# Patient Record
(demographics unavailable — no encounter records)

---

## 2024-11-25 NOTE — REVIEW OF SYSTEMS
[Fatigue] : no fatigue [Fever] : no fever [Eye Redness] : no redness [Eye Itching] : no itchy eyes [Puffy Eyelids] : no puffy ~T eyelids [Nosebleeds] : no epistaxis [Rhinorrhea] : no rhinorrhea [Nasal Dryness] : no dryness of the nose [Nasal Congestion] : no nasal congestion [Cyanosis] : no cyanosis [Chest Pain] : no chest pain or discomfort [Palpitations] : no palpitations [Difficulty Breathing] : no dyspnea [SOB at Rest] : no shortness of breath at rest [SOB with Exertion] : no dyspnea on exertion [Nocturnal Awakening] : no nocturnal awakening with shortness of breath [Cough] : no cough [Vomiting] : no vomiting [Diarrhea] : no diarrhea [Fainting (Syncope)] : no fainting

## 2024-11-25 NOTE — HISTORY OF PRESENT ILLNESS
[de-identified] : Thai is an 14 year old boy with no significant PMH here for follow-up of asthma.  Has used albuterol a few times during hockey in the last few months. A few weeks ago he was playing ice hockey in [place] and used his albuterol. 11/19 - was outdoors playing roller hockey and had chest tightness toward the end of playing. Dad picked him up and he said his chest was bothering him. Dad gave him 2 puffs of albuterol with no improvement. Tried a duoneb at home with no improvement so went to [facility].  RVP+ to rhinoenterovirus.   Has had nasal congestion for about 2 months. About once every 2 weeks he missed school due to nasal congestion, some sinus pressure.  Did not take any antibiotics.  Some nausea, abdominal pain and diarrhea.  Parents did not restart Alvesco at any times because they attributed nasal symptoms to allergies rather than a cold.   Hospital Course 15yo M hx of asthma presenting with x1d of increased WOB. Pt has had cough for the last week, and began having difficulty breathing 11/19 at night while playing roller hockey outside. He received 5 puffs of albuterol over an hour at home, and then one albuterol/atrovent nebulizer with no improvement of symptoms, and so parents brought to the ED. Mom states that pt has had intermittent nasal congestion and rhinorrhea over the last 3 weeks. No fevers at home, no diarrhea, no vomiting. No longer takes a control med at home, was weaned off Alvesco this past summer per mom. Prior to that, he has been on and off multiple controller medications for years. Has never been admitted for asthma before, though has had multiple ER visits, last in May 2024.  ED: RSS 11, 3B2B, dex, still diminished and so given Mg + NSB with some improvement. Unable to make past q2h kip, and so admitted on q2 albuterol. RVP R/E+  PMH: asthma Meds: Azelastine nasal spray daily Allergies: seasonal  Floor Course (11/20): Pt arrived to floor in stable condition. Able to wean to q4h albuterol on 11/20. Continued to tolerate PO appropriately. Pediatric pulmonology was contacted during this admission and recommended restarting Alvesco as 160mcg 2 puffs BID at the time of discharge with follow up with pulmonology in 4-6 weeks after discharge. Patient received 2 doses of decadron during this admission for a full course of steroids prior to discharge.  Child continued to tolerate PO with adequate UOP. Child remained well-appearing, with no concerning findings noted on physical exam. Care plan d/w caregivers who endorsed understanding. Anticipatory guidance and strict return precautions d/w caregivers in great detail. Child deemed stable for d/c home w/ recommended PMD f/u in 1-2 days of discharge.  June 2020:  Fall - went back to Dr. Gerardo roldan, started azelastine. Had a bronchoscopy that was relatively normal but some bronchospasm during the procedure. Starting in September he had bad nasal congestion, dark circles,  Not many courses of antibiotics.  Slept with a box of tissues.  Using albuterol rarely. Had a sleepover.      ALvesco started in January - initially 80 and then increased to 160. Symptoms much better.  About 1 week ago he was out and about, felt chest tightness. Parents tried albuterol puffs first and then the combineb. Tried another combineb in the AM and pt still felt wheezing and chest tightness. Went to [place] - poor air entry - got a dose of decadron and another combineb - still felt tight, second combineb and then he opened up and felt better.  July 2023: Briefly took Advair 115mcg/puff, 2 puffs BID x 2 weeks.then dropped to Advair 45mcg./puff 2 puffs BID and stopped completely April 16th. Sometime at the end of June he had a hockey tournament, was running around afterwards, had a coughing fit as he was laying down which improved with albuterol.  Went to sleep, woke up and played 2 games the next day with no issues. No nocturnal cough. No wheezing.  No cough with activity.  Also weaning off of Xyzal. Still seeing endocrinologist. Had another bone age a month ago which was normal.  Has another appt in October - considering a growth hormone test.  No infections, no abx use.   Feb 2023: After last visit stopped Advair regularly. Had some minor URI sx that self-resolved.  In Jan 2022 had some mild cough. Saw PMD and he thought Thai sounded tight. Restarted Advair and also gave albuterol x 1 month. Recovered from this cold. Has seemed congested on and off since his last cold. A week ago, came home from hockey and felt tight. Sat on the bench for a few, then went back to practice, and when he went home he said "my asthma is flaring up." Tried 2 puffs, and then another 2. Then tried a combineb which did nothing. Went to [facility] and had some oral steroid, then had a combineb with no improvement.  Normal O2 sat.  He was advised to go to the ED at [place].  At [place] he had a combineb and that opened him up - pt said "i feel better" He was discharged with 3 days of OCS.   Sometimes takes hockey before hockey - unsure if it helped.   Nov 2022: Pt was seen by marian in October for growth and there are no concerns right now. Bone imaging was normal. One of the markers was low but nothing to do at this time, will follow up again in January. Continued to recommend to avoid steroids.   In September, per our recommendations, pt discontinued the Flovent and switched over to  Advair 45 BID. He has not taken the Advair since October 25th. No recent illnesses, cough, congestion, URI, fevers, or rashes.   Pt takes 2 puffs of albuterol before ice hockey games about once a week. Pt start doing this a few weeks ago after he appeared to be winded after a game. He does not take albuterol before practices.  Yesterday, patient was outside for a little bit was noted to be coughing before bed. Father was going to give albuterol but patient was already asleep, no coughing overnight or today.   Meds: Dymista, Xyzal Vaccines: IUTD, received flu & COVID booster  August 2022: Continued to have very mild obstruction on spirometry and recommended to continue albuterol prn but weaned off of Flovent 110 with instructions to restart if there are any signs of cough or cold and to begin Advair 45 with first signs of illness.   July 13th: adenoidectomy and turbinate shaving -surgery was uneventful and pt was discharged same day. Had nosebleeds a few days later. ENT recommended saline spray and saline gel. Pt has been taking 3 times a day. Nosebleeds stopped.  Saw endocrine due to concerns about growth- blood tests pending - did not seem overly concerned.   No cough, no wheeze, no colds.  Attends summer camp. Engages in hockey, swimming - no limitations.  On Flovent 110, 2 puffs BID. Uses the spacer every time.  No albuterol.   June: Weaned Flovent to 1 puff BID starting May 15th. Playing hockey with no limitation. No cough with hockey. Saw Dr. Carrillo on 6/2 and was given a course of augmentin. Diagnosed with a sinusitis and treated for 10 days with abx.  Cough started 2 days ago. Cough during sleep last night. No fever, no nasal symptoms.  Scheduled for adenoid shaving on 7/13. Needs pre-surgical blood work next week.  Vacation July 6-11 - driving to [place].   He had a viral infection in March - fever, cough, headache. Then he had the Flu a week and a half ago - worse than COVID. Pt had the vaccine. Headache, vomiting, cough, fever to 103. No one in his school is wearing a mask. Tried albuterol a few times in the beginning and it did not help so did not continue. More tired than usual, appetite still not 100%. Still having some cough. Mom thinks hockey triggered it. Took zarbee's last night.   Missed ENT appt as pt had the flu.   Feb: COVID in January - mostly GI sx, severe headache, fever, abdominal cramps. No respiratory symptoms. Lasted 2.5-3 days max. Now back to baseline.  2.5 weeks ago he was playing volleyball and went to the school nurse who gave him 2 puffs. Took albuterol 2 puffs a total of 3 times and then was back to normal. Played [employer] gym 2 days later and was fine. Pacer test in December pre-COVID and felt chest tightness and took 2 puffs which helped. He continues on Flovent 44, 2 puffs BID. Cough a few nights before sleep. No nocturnal awakenings. Plays ice hockey a few times a week and keeps up. Has been playing ice hockey outdoor at night in very temps and has been fine. NO albuterol use with ice hockey.    Sept: Last seen on Aug 30th for spirometry and FeNO. FeNO was 8. He completed 5 days of azithromycin a few days ago. His cough is markedly improved. He continues on Flovent and has not needed any recent albuterol. Still has some nasal congestion but this has improved too. Pt has good energy level and can participate in sports.  Symptoms started to flare yesterday - felt lightheaded and tight. Happened at the end of recess. Went to the school nurse and had 2puffs of albuterol which did nothing. Then went to PMD who gave 60mg of prednisone. Took albuterol 2 puffs at school , then 1 puff at 8PM and then 1 at midnight. Then took 2 puffs at 8AM and 12PM today. Still feels tight. Some rhinorrhea. The night before this all started he had nausea, runny nose, scratchy eyes and headache.   He tried to lower his flovent to 1 puff BID but after 4 days he started to have problems with breathing at recess. School nurse heard some wheezing. Increased Flovent back to 2 puffs BID. 1.5 weeks ago he went to the school nurse again due to trouble breathing after doing relay races. [place] some squeaking and school nurse heard some localized wheezing. Currently still playing baseball and ice hockey - no problem. Outfield in baseball. Hockey he takes breaks every 2 minutes. Allergy symptoms have been flaring. Takes dymista and xyzal. Never has sx at home or on the weekends.   April 6th: Since his last visit he had an asthma exacerbation for which he was seen in the ED. He received albuterol puffs and decadron PO. Continued to have cough after this so was seen by PMD and given amoxicillin x 10 days which improved the cough. Father was diagnosed with COVID on 3/19. Thai had 2 rapid tests negative and 1 nasal PCR that was negative. Taking Flovent 110mcg/puff, 2 puffs BID. Last albuterol use was about 5 days before he started to get sick again.  Pt played ice hockey last week and was fine. Has a baseball scrimmage today.  No cough. Some itchy eyes. Some nasal congestion. Still on dymista and xyzal. Meds are helping. Some nausea or dry throat in the AM. Pet cat sleeps with him for some of the night. No dust mite proof covers.   Nov 2020: Thai has been well since his last visit. He continues to do distance learning for school.  He takes flovent 1 puff BID. No nocturnal cough, no activity limitation. No recent albuterol use. Also takes dymista daily and levoceitirizne 2.5mL. Recently started to have some nasal congestion.  June 2020: Last albuterol use was in March. He has been continuing on Flovent 110mcg/puff, 2 puffs BID with a spacer.  With rollerblading and running he has been ok. No coughing with activity. Takes Dymista everyday as well as levocetirizine.  Patient and mother have questions about summer camp, play dates, socialization.  Feb 2020: He had the flu since his last visit - took albuterol during this course. Since he recovered from this he still has had residual cough. He has not stopped coughing since her had the flu a few weeks ago. Went to the school nurse 4 times this week. The school nurse told his mother that she heard wheezing all 4 times that patient presented to her. Seems winded with hockey though he ploughs through the game. He started Advair the day after his last appt and has been completely adherent with it.   January 2020: Last seen in December by Dr. Cunningham who restarted him on Flovent 44. He had been doing well through the fall while off of Flovent but then started to develop wheezing out of the blue in December. Playing gym and doing "turkey trots" caused him to wheeze. Albuterol helped.  Although he has been back on Flovent his asthma seems to be doing worse comaraed to in December. Using albuterol a few times a week for coughing which helps. No nocturnal cough. Sometimes he feels wheezing in his chest. He has been having trouble in gym but not with ice hockey. Mother thinks that when he coughs a lot his color seems to drain from his face.   August, 2019: He was last seen in March at which time he was advised to stop Flovent when the weather changed to the spring.  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 No activity limitation during the sports. He has been having an early morning cough.  Last night he had a very low grade temp.  No wheezing, no albuterol use.  Over the past 2 days he has had some mild cough, low grade fever.  Just went for annual physical.  Takes dymista and levocetirizine daily.  No recent antibiotic use.   March 2019: No flu this year.  2 episodes of "wheezing" noted at school during after school house while running in the gym. He did not receive albuterol at the time. Symptoms subsided within a few minutes. No chest tightness, some cough, no activity limitation. Patient noted that he had some wheezing. He had only 1 minor cold this winter and did not use the ventolin.  He was playing ice hockey and skiing with no problems.  Minimal nasal symptoms while on xyzal but if he comes off of it he has symptoms. Itchy eyes.   November: He has been well since his last visit. He tried to come off of his oral antihistamine for 3 or 4 days and his eyes became puffy. He continues to take Dymista daily. 2 weeks ago he had a nosebleed. Mom continued the Dymista but applied some saline.  About 1 month ago he was in the [employer], was playing tag and developed wheezing. He told his teacher, took water and sat down. Did not take the inhaler and sx resolved.  No cough at night. No activity limitation. He play baseball, football and ice hockey.  He received his flu shot.   Last weekend he had a fever to 101, headache and stomach but these self-resolved.   August, 2018: He had a cold last week with fever. His allergy symptoms have been flaring up this week with nasal congestion and some ocular pruritus. He has been taking dymista and levocetirizine daily since his last visit. He has also been on Flovent since the end of May. He has not required and doses of albuterol. This summer he attended baseball camp, regular camp and wrestling camp. He was able to keep up with his peers and never had any activity limitation. Even prior to the Flovent he never had difficulty with baseball so this is not really a change for him, however the cough has improved dramatically since starting Flovent. Cough initially started in January and was present both day and night. It prevented Thai from falling asleep and was very disruptive in class. This most recent cold was characterized by headache, fever and congestion but no cough.  Overall his mom thinks he is doing much better in regards to cough since starting Flovent.  June 2018: At his last visit he was started on Flovent 44mcg/puff due to persistent cough despite treatment for rhinitis and some bronchodilator reversibility. His cough has considerably improved since starting Flovent. He has been taking Flovent 2 puffs BID with a spacer. Rinses his mouth out after Flovent use. He has no activity limitation and no nocturnal cough. He is at baseball camp and keeps up with peers. Early AM cough has disappeared.  He continues on the Flonase and levocetirizine. No more nasal congestion.  He is stable from a concussion standpoint.  May, 2018:  At his last visit, his nasal smear was positive for eosinophils. IDT positive to cat and mold and the IDT to dust mite became red and itchy the next day. Since his last visit he has been much improved overall from an allergy perspective however he fell down the stairs and has a concussion. His grandfather also just passed away. No more episodes of ocular swelling.  He has episodes of coughing more at night than during the day but also during the day. He has a dry cough that prevents him from falling asleep at times. Sometimes he wakes up with chest tightness and then coughs. Last albuterol use was 3 days ago.   Nasal congestion much better. Some eye itching. Taking levocetirizine nightly as well as flonase.   March, 2018: Since the beginning of February he has had unilateral and bilateral ocular swelling and pruritus. Burning sometimes too. Eyes look "glistening" and he has some clear/whitish drainage. No green drainage. No fever. Episodes of ocular swelling last a few hours and then self- resolve. Occurs more at home but does have symptoms at school. Seen by Dr. Brooks in February and both SPT and immunocap testing were negative to environmental allergens. Seeking any possible further allergy testing. He tried daily zyrtec and took that for the past month. Prior to that he was on claritin daily.  Olopatadine since February - daily. Also on dymista nasal spray since Feb.  Some of the rhinorrhea symptoms have improved but ocular symptoms are the same.   Seen by Dr. Alarcon from ENT who did a rhinoscopy - no nasal polyps but significant nasal congestion. Recommended sinus rinses.   Food allergy: No suspicion for food allergy. Tolerates milk, eggs, wheat, soy, peanut, tree nut, fish and shellfish.  Urticaria - develops hives when he is sick. Occurs a few times a year, not frequently.   Infections - about once or twice a year he takes antibiotics. No recurrent AOM, PNA, bronchitis.  No hospitalizations

## 2024-11-25 NOTE — HISTORY OF PRESENT ILLNESS
[de-identified] : Thai is an 14 year old boy with no significant PMH here for follow-up of asthma.  Has used albuterol a few times during hockey in the last few months. A few weeks ago he was playing ice hockey in [employer] and used his albuterol. 11/19 - was outdoors playing roller hockey and had chest tightness toward the end of playing. Dad picked him up and he said his chest was bothering him. Dad gave him 2 puffs of albuterol with no improvement. Tried a duoneb at home with no improvement [place] [facility] [place]  RVP+ to rhinoenterovirus.   Has had nasal congestion for about 2 months. About once every 2 weeks he missed school due to nasal congestion, some sinus pressure.  Did not take any antibiotics.  Some nausea, abdominal pain and diarrhea.  Parents did not restart Alvesco at any times because they attributed nasal symptoms to allergies rather than a cold.   Hospital Course 13yo M hx of asthma presenting with x1d of increased WOB. Pt has had cough for the last week, and began having difficulty breathing 11/19 at night while playing roller hockey outside. He received 5 puffs of albuterol over an hour at home, and then one albuterol/atrovent nebulizer with no improvement of symptoms, and so parents brought to the ED. Mom states that pt has had intermittent nasal congestion and rhinorrhea over the last 3 weeks. No fevers at home, no diarrhea, no vomiting. No longer takes a control med at home, was weaned off Alvesco this past summer per mom. Prior to that, he has been on and off multiple controller medications for years. Has never been admitted for asthma before, though has had multiple ER visits, last in May 2024.  ED: RSS 11, 3B2B, dex, still diminished and so given Mg + NSB with some improvement. Unable to make past q2h kip, and so admitted on q2 albuterol. RVP R/E+  PMH: asthma Meds: Azelastine nasal spray daily Allergies: seasonal  Floor Course (11/20): Pt arrived to floor in stable condition. Able to wean to q4h albuterol on 11/20. Continued to tolerate PO appropriately. Pediatric pulmonology was contacted during this admission and recommended restarting Alvesco as 160mcg 2 puffs BID at the time of discharge with follow up with pulmonology in 4-6 weeks after discharge. Patient received 2 doses of decadron during this admission for a full course of steroids prior to discharge.  Child continued to tolerate PO with adequate UOP. Child remained well-appearing, with no concerning findings noted on physical exam. Care plan d/w caregivers who endorsed understanding. Anticipatory guidance and strict return precautions d/w caregivers in great detail. Child deemed stable for d/c home w/ recommended PMD f/u in 1-2 days of discharge.  June 2020:  Fall - went back to Dr. Gerardo roldan, started azelastine. Had a bronchoscopy that was relatively normal but some bronchospasm during the procedure. Starting in September he had bad nasal congestion, dark circles,  Not many courses of antibiotics.  Slept with a box of tissues.  Using albuterol rarely. Had a sleepover.      ALvesco started in January - initially 80 and then increased to 160. Symptoms much better.  About 1 week ago he was out and about, felt chest tightness. Parents tried albuterol puffs first and then the combineb. Tried another combineb in the AM and pt still felt wheezing and chest tightness. Went to [place] - poor air entry - got a dose of decadron and another combineb - still felt tight, second combineb and then he opened up and felt better.  July 2023: Briefly took Advair 115mcg/puff, 2 puffs BID x 2 weeks.then dropped to Advair 45mcg./puff 2 puffs BID and stopped completely April 16th. Sometime at the end of June he had a hockey tournament, was running around afterwards, had a coughing fit as he was laying down which improved with albuterol.  Went to sleep, woke up and played 2 games the next day with no issues. No nocturnal cough. No wheezing.  No cough with activity.  Also weaning off of Xyzal. Still seeing endocrinologist. Had another bone age a month ago which was normal.  Has another appt in October - considering a growth hormone test.  No infections, no abx use.   Feb 2023: After last visit stopped Advair regularly. Had some minor URI sx that self-resolved.  In Jan 2022 had some mild cough. Saw PMD and he thought Thai sounded tight. Restarted Advair and also gave albuterol x 1 month. Recovered from this cold. Has seemed congested on and off since his last cold. A week ago, came home from hockey and felt tight. Sat on the bench for a few, then went back to practice, and when he went home he said "my asthma is flaring up." Tried 2 puffs, and then another 2. Then tried a combineb which did nothing. Went to [facility] and had some oral steroid, then had a combineb with no improvement.  Normal O2 sat.  He was advised to go to the ED at [place].  At [place] he had a combineb and that opened him up - pt said "i feel better" He was discharged with 3 days of OCS.   Sometimes takes hockey before hockey - unsure if it helped.   Nov 2022: Pt was seen by marian in October for growth and there are no concerns right now. Bone imaging was normal. One of the markers was low but nothing to do at this time, will follow up again in January. Continued to recommend to avoid steroids.   In September, per our recommendations, pt discontinued the Flovent and switched over to  Advair 45 BID. He has not taken the Advair since October 25th. No recent illnesses, cough, congestion, URI, fevers, or rashes.   Pt takes 2 puffs of albuterol before ice hockey games about once a week. Pt start doing this a few weeks ago after he appeared to be winded after a game. He does not take albuterol before practices.  Yesterday, patient was outside for a little bit was noted to be coughing before bed. Father was going to give albuterol but patient was already asleep, no coughing overnight or today.   Meds: Dymista, Xyzal Vaccines: IUTD, received flu & COVID booster  August 2022: Continued to have very mild obstruction on spirometry and recommended to continue albuterol prn but weaned off of Flovent 110 with instructions to restart if there are any signs of cough or cold and to begin Advair 45 with first signs of illness.   July 13th: adenoidectomy and turbinate shaving -surgery was uneventful and pt was discharged same day. Had nosebleeds a few days later. ENT recommended saline spray and saline gel. Pt has been taking 3 times a day. Nosebleeds stopped.  Saw endocrine due to concerns about growth- blood tests pending - did not seem overly concerned.   No cough, no wheeze, no colds.  Attends summer camp. Engages in hockey, swimming - no limitations.  On Flovent 110, 2 puffs BID. Uses the spacer every time.  No albuterol.   June: Weaned Flovent to 1 puff BID starting May 15th. Playing hockey with no limitation. No cough with hockey. Saw Dr. Carrillo on 6/2 and was given a course of augmentin. Diagnosed with a sinusitis and treated for 10 days with abx.  Cough started 2 days ago. Cough during sleep last night. No fever, no nasal symptoms.  Scheduled for adenoid shaving on 7/13. Needs pre-surgical blood work next week.  Vacation July 6-11 - driving to [place].   He had a viral infection in March - fever, cough, headache. Then he had the Flu a week and a half ago - worse than COVID. Pt had the vaccine. Headache, vomiting, cough, fever to 103. No one in his school is wearing a mask. Tried albuterol a few times in the beginning and it did not help so did not continue. More tired than usual, appetite still not 100%. Still having some cough. Mom thinks hockey triggered it. Took zarbee's last night.   Missed ENT appt as pt had the flu.   Feb: COVID in January - mostly GI sx, severe headache, fever, abdominal cramps. No respiratory symptoms. Lasted 2.5-3 days max. Now back to baseline.  2.5 weeks ago he was playing volleyball and went to the school nurse who gave him 2 puffs. Took albuterol 2 puffs a total of 3 times and then was back to normal. Played [employer] gym 2 days later and was fine. Pacer test in December pre-COVID and felt chest tightness and took 2 puffs which helped. He continues on Flovent 44, 2 puffs BID. Cough a few nights before sleep. No nocturnal awakenings. Plays ice hockey a few times a week and keeps up. Has been playing ice hockey outdoor at night in very temps and has been fine. NO albuterol use with ice hockey.    Sept: Last seen on Aug 30th for spirometry and FeNO. FeNO was 8. He completed 5 days of azithromycin a few days ago. His cough is markedly improved. He continues on Flovent and has not needed any recent albuterol. Still has some nasal congestion but this has improved too. Pt has good energy level and can participate in sports.  Symptoms started to flare yesterday - felt lightheaded and tight. Happened at the end of recess. Went to the school nurse and had 2puffs of albuterol which did nothing. Then went to PMD who gave 60mg of prednisone. Took albuterol 2 puffs at school , then 1 puff at 8PM and then 1 at midnight. Then took 2 puffs at 8AM and 12PM today. Still feels tight. Some rhinorrhea. The night before this all started he had nausea, runny nose, scratchy eyes and headache.   He tried to lower his flovent to 1 puff BID but after 4 days he started to have problems with breathing at recess. School nurse heard some wheezing. Increased Flovent back to 2 puffs BID. 1.5 weeks ago he went to the school nurse again due to trouble breathing after doing relay races. [place] some squeaking and school nurse heard some localized wheezing. Currently still playing baseball and ice hockey - no problem. Outfield in baseball. Hockey he takes breaks every 2 minutes. Allergy symptoms have been flaring. Takes dymista and xyzal. Never has sx at home or on the weekends.   April 6th: Since his last visit he had an asthma exacerbation for which he was seen in the ED. He received albuterol puffs and decadron PO. Continued to have cough after this so was seen by PMD and given amoxicillin x 10 days which improved the cough. Father was diagnosed with COVID on 3/19. Thai had 2 rapid tests negative and 1 nasal PCR that was negative. Taking Flovent 110mcg/puff, 2 puffs BID. Last albuterol use was about 5 days before he started to get sick again.  Pt played ice hockey last week and was fine. Has a baseball scrimmage today.  No cough. Some itchy eyes. Some nasal congestion. Still on dymista and xyzal. Meds are helping. Some nausea or dry throat in the AM. Pet cat sleeps with him for some of the night. No dust mite proof covers.   Nov 2020: Thai has been well since his last visit. He continues to do distance learning for school.  He takes flovent 1 puff BID. No nocturnal cough, no activity limitation. No recent albuterol use. Also takes dymista daily and levoceitirizne 2.5mL. Recently started to have some nasal congestion.  June 2020: Last albuterol use was in March. He has been continuing on Flovent 110mcg/puff, 2 puffs BID with a spacer.  With rollerblading and running he has been ok. No coughing with activity. Takes Dymista everyday as well as levocetirizine.  Patient and mother have questions about summer camp, play dates, socialization.  Feb 2020: He had the flu since his last visit - took albuterol during this course. Since he recovered from this he still has had residual cough. He has not stopped coughing since her had the flu a few weeks ago. Went to the school nurse 4 times this week. The school nurse told his mother that she heard wheezing all 4 times that patient presented to her. Seems winded with hockey though he ploughs through the game. He started Advair the day after his last appt and has been completely adherent with it.   January 2020: Last seen in December by Dr. Cunningham who restarted him on Flovent 44. He had been doing well through the fall while off of Flovent but then started to develop wheezing out of the blue in December. Playing gym and doing "turkey trots" caused him to wheeze. Albuterol helped.  Although he has been back on Flovent his asthma seems to be doing worse comaraed to in December. Using albuterol a few times a week for coughing which helps. No nocturnal cough. Sometimes he feels wheezing in his chest. He has been having trouble in gym but not with ice hockey. Mother thinks that when he coughs a lot his color seems to drain from his face.   August, 2019: He was last seen in March at which time he was advised to stop Flovent when the weather changed to the spring.  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 No activity limitation during the sports. He has been having an early morning cough.  Last night he had a very low grade temp.  No wheezing, no albuterol use.  Over the past 2 days he has had some mild cough, low grade fever.  Just went for annual physical.  Takes dymista and levocetirizine daily.  No recent antibiotic use.   March 2019: No flu this year.  2 episodes of "wheezing" noted at school during after school house while running in the gym. He did not receive albuterol at the time. Symptoms subsided within a few minutes. No chest tightness, some cough, no activity limitation. Patient noted that he had some wheezing. He had only 1 minor cold this winter and did not use the ventolin.  He was playing ice hockey and skiing with no problems.  Minimal nasal symptoms while on xyzal but if he comes off of it he has symptoms. Itchy eyes.   November: He has been well since his last visit. He tried to come off of his oral antihistamine for 3 or 4 days and his eyes became puffy. He continues to take Dymista daily. 2 weeks ago he had a nosebleed. Mom continued the Dymista but applied some saline.  About 1 month ago he was in the [employer], was playing tag and developed wheezing. He told his teacher, took water and sat down. Did not take the inhaler and sx resolved.  No cough at night. No activity limitation. He play baseball, football and ice hockey.  He received his flu shot.   Last weekend he had a fever to 101, headache and stomach but these self-resolved.   August, 2018: He had a cold last week with fever. His allergy symptoms have been flaring up this week with nasal congestion and some ocular pruritus. He has been taking dymista and levocetirizine daily since his last visit. He has also been on Flovent since the end of May. He has not required and doses of albuterol. This summer he attended baseball camp, regular camp and wrestling camp. He was able to keep up with his peers and never had any activity limitation. Even prior to the Flovent he never had difficulty with baseball so this is not really a change for him, however the cough has improved dramatically since starting Flovent. Cough initially started in January and was present both day and night. It prevented Thai from falling asleep and was very disruptive in class. This most recent cold was characterized by headache, fever and congestion but no cough.  Overall his mom thinks he is doing much better in regards to cough since starting Flovent.  June 2018: At his last visit he was started on Flovent 44mcg/puff due to persistent cough despite treatment for rhinitis and some bronchodilator reversibility. His cough has considerably improved since starting Flovent. He has been taking Flovent 2 puffs BID with a spacer. Rinses his mouth out after Flovent use. He has no activity limitation and no nocturnal cough. He is at baseball camp and keeps up with peers. Early AM cough has disappeared.  He continues on the Flonase and levocetirizine. No more nasal congestion.  He is stable from a concussion standpoint.  May, 2018:  At his last visit, his nasal smear was positive for eosinophils. IDT positive to cat and mold and the IDT to dust mite became red and itchy the next day. Since his last visit he has been much improved overall from an allergy perspective however he fell down the stairs and has a concussion. His grandfather also just passed away. No more episodes of ocular swelling.  He has episodes of coughing more at night than during the day but also during the day. He has a dry cough that prevents him from falling asleep at times. Sometimes he wakes up with chest tightness and then coughs. Last albuterol use was 3 days ago.   Nasal congestion much better. Some eye itching. Taking levocetirizine nightly as well as flonase.   March, 2018: Since the beginning of February he has had unilateral and bilateral ocular swelling and pruritus. Burning sometimes too. Eyes look "glistening" and he has some clear/whitish drainage. No green drainage. No fever. Episodes of ocular swelling last a few hours and then self- resolve. Occurs more at home but does have symptoms at school. Seen by Dr. Brooks in February and both SPT and immunocap testing were negative to environmental allergens. Seeking any possible further allergy testing. He tried daily zyrtec and took that for the past month. Prior to that he was on claritin daily.  Olopatadine since February - daily. Also on dymista nasal spray since Feb.  Some of the rhinorrhea symptoms have improved but ocular symptoms are the same.   Seen by Dr. Alarcon from ENT who did a rhinoscopy - no nasal polyps but significant nasal congestion. Recommended sinus rinses.   Food allergy: No suspicion for food allergy. Tolerates milk, eggs, wheat, soy, peanut, tree nut, fish and shellfish.  Urticaria - develops hives when he is sick. Occurs a few times a year, not frequently.   Infections - about once or twice a year he takes antibiotics. No recurrent AOM, PNA, bronchitis.  No hospitalizations

## 2025-01-07 NOTE — HISTORY OF PRESENT ILLNESS
[Asthma] : asthma [de-identified] : Thai is a 14 year old boy who presents for follow-up of his asthma after a recent admission.  He has been feeling well with regards to asthma for the past month. Alvesco 160 - 2 puffs BID. No missed doses - sometimes will take AM dose later if on vacation. Since his last exacerbation resolved he has not needed albuterol. Will only reach for it when he feels very tight. Pt demonstrated poor spacer technique today during office visit. He is inhaling but not holding his breath adequately.  Continues to struggle with chronic nasal congestion.  He has been off of Flonase for years. Taking nasal spray only.  Nov 2024: Has used albuterol a few times during hockey in the last few months. A few weeks ago he was playing ice hockey in [place] and used his albuterol. 11/19 - was outdoors playing roller hockey and had chest tightness toward the end of playing. Dad picked him up and he said his chest was bothering him. Dad gave him 2 puffs of albuterol with no improvement. Tried a duoneb at home with no improvement so went to [facility].  RVP+ to rhinoenterovirus.   Has had nasal congestion for about 2 months. About once every 2 weeks he missed school due to nasal congestion, some sinus pressure.  Did not take any antibiotics.  Some nausea, abdominal pain and diarrhea.  Parents did not restart Alvesco at any times because they attributed nasal symptoms to allergies rather than a cold.   Hospital Course 13yo M hx of asthma presenting with x1d of increased WOB. Pt has had cough for the last week, and began having difficulty breathing 11/19 at night while playing roller hockey outside. He received 5 puffs of albuterol over an hour at home, and then one albuterol/atrovent nebulizer with no improvement of symptoms, and so parents brought to the ED. Mom states that pt has had intermittent nasal congestion and rhinorrhea over the last 3 weeks. No fevers at home, no diarrhea, no vomiting. No longer takes a control med at home, was weaned off Alvesco this past summer per mom. Prior to that, he has been on and off multiple controller medications for years. Has never been admitted for asthma before, though has had multiple ER visits, last in May 2024.  ED: RSS 11, 3B2B, dex, still diminished and so given Mg + NSB with some improvement. Unable to make past q2h kip, and so admitted on q2 albuterol. RVP R/E+  PMH: asthma Meds: Azelastine nasal spray daily Allergies: seasonal  Floor Course (11/20): Pt arrived to floor in stable condition. Able to wean to q4h albuterol on 11/20. Continued to tolerate PO appropriately. Pediatric pulmonology was contacted during this admission and recommended restarting Alvesco as 160mcg 2 puffs BID at the time of discharge with follow up with pulmonology in 4-6 weeks after discharge. Patient received 2 doses of decadron during this admission for a full course of steroids prior to discharge.  Child continued to tolerate PO with adequate UOP. Child remained well-appearing, with no concerning findings noted on physical exam. Care plan d/w caregivers who endorsed understanding. Anticipatory guidance and strict return precautions d/w caregivers in great detail. Child deemed stable for d/c home w/ recommended PMD f/u in 1-2 days of discharge.  June 2020:  Fall - went back to Dr. Gerardo roldan, started azelastine. Had a bronchoscopy that was relatively normal but some bronchospasm during the procedure. Starting in September he had bad nasal congestion, dark circles,  Not many courses of antibiotics.  Slept with a box of tissues.  Using albuterol rarely. Had a sleepover.      ALvesco started in January - initially 80 and then increased to 160. Symptoms much better.  About 1 week ago he was out and about, felt chest tightness. Parents tried albuterol puffs first and then the combineb. Tried another combineb in the AM and pt still felt wheezing and chest tightness. Went to [facility] - poor air entry - got a dose of decadron and another combineb - still felt tight, second combineb and then he opened up and felt better.  July 2023: Briefly took Advair 115mcg/puff, 2 puffs BID x 2 weeks.then dropped to Advair 45mcg./puff 2 puffs BID and stopped completely April 16th. Sometime at the end of June he had a hockey tournament, was running around afterwards, had a coughing fit as he was laying down which improved with albuterol.  Went to sleep, woke up and played 2 games the next day with no issues. No nocturnal cough. No wheezing.  No cough with activity.  Also weaning off of Xyzal. Still seeing endocrinologist. Had another bone age a month ago which was normal.  Has another appt in October - considering a growth hormone test.  No infections, no abx use.   Feb 2023: After last visit stopped Advair regularly. Had some minor URI sx that self-resolved.  In Jan 2022 had some mild cough. Saw PMD and he thought Thai sounded tight. Restarted Advair and also gave albuterol x 1 month. Recovered from this cold. Has seemed congested on and off since his last cold. A week ago, came home from hockey and felt tight. Sat on the bench for a few, then went back to practice, and when he went home he said "my asthma is flaring up." Tried 2 puffs, and then another 2. Then tried a combineb which did nothing. Went to [facility] and had some oral steroid, then had a combineb with no improvement.  Normal O2 sat.  He was advised to go to the ED at [facility].  At [facility] he had a combineb and that opened him up - pt said "i feel better" He was discharged with 3 days of OCS.   Sometimes takes hockey before hockey - unsure if it helped.   Nov 2022: Pt was seen by marian in October for growth and there are no concerns right now. Bone imaging was normal. One of the markers was low but nothing to do at this time, will follow up again in January. Continued to recommend to avoid steroids.   In September, per our recommendations, pt discontinued the Flovent and switched over to  Advair 45 BID. He has not taken the Advair since October 25th. No recent illnesses, cough, congestion, URI, fevers, or rashes.   Pt takes 2 puffs of albuterol before ice hockey games about once a week. Pt start doing this a few weeks ago after he appeared to be winded after a game. He does not take albuterol before practices.  Yesterday, patient was outside for a little bit was noted to be coughing before bed. Father was going to give albuterol but patient was already asleep, no coughing overnight or today.   Meds: Dymista, Xyzal Vaccines: IUTD, received flu & COVID booster  August 2022: Continued to have very mild obstruction on spirometry and recommended to continue albuterol prn but weaned off of Flovent 110 with instructions to restart if there are any signs of cough or cold and to begin Advair 45 with first signs of illness.   July 13th: adenoidectomy and turbinate shaving -surgery was uneventful and pt was discharged same day. Had nosebleeds a few days later. ENT recommended saline spray and saline gel. Pt has been taking 3 times a day. Nosebleeds stopped.  Saw endocrine due to concerns about growth- blood tests pending - did not seem overly concerned.   No cough, no wheeze, no colds.  Attends summer camp. Engages in hockey, swimming - no limitations.  On Flovent 110, 2 puffs BID. Uses the spacer every time.  No albuterol.   June: Weaned Flovent to 1 puff BID starting May 15th. Playing hockey with no limitation. No cough with hockey. Saw Dr. Carrillo on 6/2 and was given a course of augmentin. Diagnosed with a sinusitis and treated for 10 days with abx.  Cough started 2 days ago. Cough during sleep last night. No fever, no nasal symptoms.  Scheduled for adenoid shaving on 7/13. Needs pre-surgical blood work next week.  Vacation July 6-11 - driving to [place].   He had a viral infection in March - fever, cough, headache. Then he had the Flu a week and a half ago - worse than COVID. Pt had the vaccine. Headache, vomiting, cough, fever to 103. No one in his school is wearing a mask. Tried albuterol a few times in the beginning and it did not help so did not continue. More tired than usual, appetite still not 100%. Still having some cough. Mom thinks hockey triggered it. Took zarbee's last night.   Missed ENT appt as pt had the flu.   Feb: COVID in January - mostly GI sx, severe headache, fever, abdominal cramps. No respiratory symptoms. Lasted 2.5-3 days max. Now back to baseline.  2.5 weeks ago he was playing volleyball and went to the school nurse who gave him 2 puffs. Took albuterol 2 puffs a total of 3 times and then was back to normal. Played volleyball gym 2 days later and was fine. Pacer test in December pre-COVID and felt chest tightness and took 2 puffs which helped. He continues on Flovent 44, 2 puffs BID. Cough a few nights before sleep. No nocturnal awakenings. Plays ice hockey a few times a week and keeps up. Has been playing ice hockey outdoor at night in very temps and has been fine. NO albuterol use with ice hockey.    Sept: Last seen on Aug 30th for spirometry and FeNO. FeNO was 8. He completed 5 days of azithromycin a few days ago. His cough is markedly improved. He continues on Flovent and has not needed any recent albuterol. Still has some nasal congestion but this has improved too. Pt has good energy level and can participate in sports.  Symptoms started to flare yesterday - felt lightheaded and tight. Happened at the end of recess. Went to the school nurse and had 2puffs of albuterol which did nothing. Then went to PMD who gave 60mg of prednisone. Took albuterol 2 puffs at school , then 1 puff at 8PM and then 1 at midnight. Then took 2 puffs at 8AM and 12PM today. Still feels tight. Some rhinorrhea. The night before this all started he had nausea, runny nose, scratchy eyes and headache.   He tried to lower his flovent to 1 puff BID but after 4 days he started to have problems with breathing at recess. School nurse heard some wheezing. Increased Flovent back to 2 puffs BID. 1.5 weeks ago he went to the school nurse again due to trouble breathing after doing relay races. [place] some squeaking and school nurse heard some localized wheezing. Currently still playing baseball and ice hockey - no problem. Outfield in baseball. Hockey he takes breaks every 2 minutes. Allergy symptoms have been flaring. Takes dymista and xyzal. Never has sx at home or on the weekends.   April 6th: Since his last visit he had an asthma exacerbation for which he was seen in the ED. He received albuterol puffs and decadron PO. Continued to have cough after this so was seen by PMD and given amoxicillin x 10 days which improved the cough. Father was diagnosed with COVID on 3/19. Thai had 2 rapid tests negative and 1 nasal PCR that was negative. Taking Flovent 110mcg/puff, 2 puffs BID. Last albuterol use was about 5 days before he started to get sick again.  Pt played ice hockey last week and was fine. Has a baseball scrimmage today.  No cough. Some itchy eyes. Some nasal congestion. Still on dymista and xyzal. Meds are helping. Some nausea or dry throat in the AM. Pet cat sleeps with him for some of the night. No dust mite proof covers.   Nov 2020: Thai has been well since his last visit. He continues to do distance learning for school.  He takes flovent 1 puff BID. No nocturnal cough, no activity limitation. No recent albuterol use. Also takes dymista daily and levoceitirizne 2.5mL. Recently started to have some nasal congestion.  June 2020: Last albuterol use was in March. He has been continuing on Flovent 110mcg/puff, 2 puffs BID with a spacer.  With rollerblading and running he has been ok. No coughing with activity. Takes Dymista everyday as well as levocetirizine.  Patient and mother have questions about summer camp, play dates, socialization.  Feb 2020: He had the flu since his last visit - took albuterol during this course. Since he recovered from this he still has had residual cough. He has not stopped coughing since her had the flu a few weeks ago. Went to the school nurse 4 times this week. The school nurse told his mother that she heard wheezing all 4 times that patient presented to her. Seems winded with hockey though he ploughs through the game. He started Advair the day after his last appt and has been completely adherent with it.   January 2020: Last seen in December by Dr. Cunningham who restarted him on Flovent 44. He had been doing well through the fall while off of Flovent but then started to develop wheezing out of the blue in December. Playing gym and doing "turkey trots" caused him to wheeze. Albuterol helped.  Although he has been back on Flovent his asthma seems to be doing worse comaraed to in December. Using albuterol a few times a week for coughing which helps. No nocturnal cough. Sometimes he feels wheezing in his chest. He has been having trouble in gym but not with ice hockey. Mother thinks that when he coughs a lot his color seems to drain from his face.   August, 2019: He was last seen in March at which time he was advised to stop Flovent when the weather changed to the spring.  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 No activity limitation during the sports. He has been having an early morning cough.  Last night he had a very low grade temp.  No wheezing, no albuterol use.  Over the past 2 days he has had some mild cough, low grade fever.  Just went for annual physical.  Takes dymista and levocetirizine daily.  No recent antibiotic use.   March 2019: No flu this year.  2 episodes of "wheezing" noted at school during after school house while running in the gym. He did not receive albuterol at the time. Symptoms subsided within a few minutes. No chest tightness, some cough, no activity limitation. Patient noted that he had some wheezing. He had only 1 minor cold this winter and did not use the ventolin.  He was playing ice hockey and skiing with no problems.  Minimal nasal symptoms while on xyzal but if he comes off of it he has symptoms. Itchy eyes.   November: He has been well since his last visit. He tried to come off of his oral antihistamine for 3 or 4 days and his eyes became puffy. He continues to take Dymista daily. 2 weeks ago he had a nosebleed. Mom continued the Dymista but applied some saline.  About 1 month ago he was in the [employer], was playing tag and developed wheezing. He told his teacher, took water and sat down. Did not take the inhaler and sx resolved.  No cough at night. No activity limitation. He play baseball, football and ice hockey.  He received his flu shot.   Last weekend he had a fever to 101, headache and stomach but these self-resolved.   August, 2018: He had a cold last week with fever. His allergy symptoms have been flaring up this week with nasal congestion and some ocular pruritus. He has been taking dymista and levocetirizine daily since his last visit. He has also been on Flovent since the end of May. He has not required and doses of albuterol. This summer he attended baseball camp, regular camp and wrestling camp. He was able to keep up with his peers and never had any activity limitation. Even prior to the Flovent he never had difficulty with baseball so this is not really a change for him, however the cough has improved dramatically since starting Flovent. Cough initially started in January and was present both day and night. It prevented Thai from falling asleep and was very disruptive in class. This most recent cold was characterized by headache, fever and congestion but no cough.  Overall his mom thinks he is doing much better in regards to cough since starting Flovent.  June 2018: At his last visit he was started on Flovent 44mcg/puff due to persistent cough despite treatment for rhinitis and some bronchodilator reversibility. His cough has considerably improved since starting Flovent. He has been taking Flovent 2 puffs BID with a spacer. Rinses his mouth out after Flovent use. He has no activity limitation and no nocturnal cough. He is at baseball camp and keeps up with peers. Early AM cough has disappeared.  He continues on the Flonase and levocetirizine. No more nasal congestion.  He is stable from a concussion standpoint.  May, 2018:  At his last visit, his nasal smear was positive for eosinophils. IDT positive to cat and mold and the IDT to dust mite became red and itchy the next day. Since his last visit he has been much improved overall from an allergy perspective however he fell down the stairs and has a concussion. His grandfather also just passed away. No more episodes of ocular swelling.  He has episodes of coughing more at night than during the day but also during the day. He has a dry cough that prevents him from falling asleep at times. Sometimes he wakes up with chest tightness and then coughs. Last albuterol use was 3 days ago.   Nasal congestion much better. Some eye itching. Taking levocetirizine nightly as well as flonase.   March, 2018: Since the beginning of February he has had unilateral and bilateral ocular swelling and pruritus. Burning sometimes too. Eyes look "glistening" and he has some clear/whitish drainage. No green drainage. No fever. Episodes of ocular swelling last a few hours and then self- resolve. Occurs more at home but does have symptoms at school. Seen by Dr. Brooks in February and both SPT and immunocap testing were negative to environmental allergens. Seeking any possible further allergy testing. He tried daily zyrtec and took that for the past month. Prior to that he was on claritin daily.  Olopatadine since February - daily. Also on dymista nasal spray since Feb.  Some of the rhinorrhea symptoms have improved but ocular symptoms are the same.   Seen by Dr. Alarcon from ENT who did a rhinoscopy - no nasal polyps but significant nasal congestion. Recommended sinus rinses.   Food allergy: No suspicion for food allergy. Tolerates milk, eggs, wheat, soy, peanut, tree nut, fish and shellfish.  Urticaria - develops hives when he is sick. Occurs a few times a year, not frequently.   Infections - about once or twice a year he takes antibiotics. No recurrent AOM, PNA, bronchitis.  No hospitalizations

## 2025-01-07 NOTE — REASON FOR VISIT
[Routine Follow-Up] : a routine follow-up visit for [Father] : father [[email or web address]] : chronic rhinitis

## 2025-01-07 NOTE — PHYSICAL EXAM

## 2025-01-07 NOTE — HISTORY OF PRESENT ILLNESS
[Asthma] : asthma [de-identified] : Thai is a 14 year old boy who presents for follow-up of his asthma after a recent admission.  He has been feeling well with regards to asthma for the past month. Alvesco 160 - 2 puffs BID. No missed doses - sometimes will take AM dose later if on vacation. Since his last exacerbation resolved he has not needed albuterol. Will only reach for it when he feels very tight. Pt demonstrated poor spacer technique today during office visit. He is inhaling but not holding his breath adequately.  Continues to struggle with chronic nasal congestion.  He has been off of Flonase for years. Taking nasal spray only.  Nov 2024: Has used albuterol a few times during hockey in the last few months. A few weeks ago he was playing ice hockey in [employer] and used his albuterol. 11/19 - was outdoors playing roller hockey and had chest tightness toward the end of playing. Dad picked him up and he said his chest was bothering him. Dad gave him 2 puffs of albuterol with no improvement. Tried a duoneb at home with no improvement so went to [facility].  RVP+ to rhinoenterovirus.   Has had nasal congestion for about 2 months. About once every 2 weeks he missed school due to nasal congestion, some sinus pressure.  Did not take any antibiotics.  Some nausea, abdominal pain and diarrhea.  Parents did not restart Alvesco at any times because they attributed nasal symptoms to allergies rather than a cold.   Hospital Course 13yo M hx of asthma presenting with x1d of increased WOB. Pt has had cough for the last week, and began having difficulty breathing 11/19 at night while playing roller hockey outside. He received 5 puffs of albuterol over an hour at home, and then one albuterol/atrovent nebulizer with no improvement of symptoms, and so parents brought to the ED. Mom states that pt has had intermittent nasal congestion and rhinorrhea over the last 3 weeks. No fevers at home, no diarrhea, no vomiting. No longer takes a control med at home, was weaned off Alvesco this past summer per mom. Prior to that, he has been on and off multiple controller medications for years. Has never been admitted for asthma before, though has had multiple ER visits, last in May 2024.  ED: RSS 11, 3B2B, dex, still diminished and so given Mg + NSB with some improvement. Unable to make past q2h kip, and so admitted on q2 albuterol. RVP R/E+  PMH: asthma Meds: Azelastine nasal spray daily Allergies: seasonal  Floor Course (11/20): Pt arrived to floor in stable condition. Able to wean to q4h albuterol on 11/20. Continued to tolerate PO appropriately. Pediatric pulmonology was contacted during this admission and recommended restarting Alvesco as 160mcg 2 puffs BID at the time of discharge with follow up with pulmonology in 4-6 weeks after discharge. Patient received 2 doses of decadron during this admission for a full course of steroids prior to discharge.  Child continued to tolerate PO with adequate UOP. Child remained well-appearing, with no concerning findings noted on physical exam. Care plan d/w caregivers who endorsed understanding. Anticipatory guidance and strict return precautions d/w caregivers in great detail. Child deemed stable for d/c home w/ recommended PMD f/u in 1-2 days of discharge.  June 2020:  Fall - went back to Dr. Gerardo roldan, started azelastine. Had a bronchoscopy that was relatively normal but some bronchospasm during the procedure. Starting in September he had bad nasal congestion, dark circles,  Not many courses of antibiotics.  Slept with a box of tissues.  Using albuterol rarely. Had a sleepover.      ALvesco started in January - initially 80 and then increased to 160. Symptoms much better.  About 1 week ago he was out and about, felt chest tightness. Parents tried albuterol puffs first and then the combineb. Tried another combineb in the AM and pt still felt wheezing and chest tightness. Went to [place] - poor air entry - got a dose of decadron and another combineb - still felt tight, second combineb and then he opened up and felt better.  July 2023: Briefly took Advair 115mcg/puff, 2 puffs BID x 2 weeks.then dropped to Advair 45mcg./puff 2 puffs BID and stopped completely April 16th. Sometime at the end of June he had a hockey tournament, was running around afterwards, had a coughing fit as he was laying down which improved with albuterol.  Went to sleep, woke up and played 2 games the next day with no issues. No nocturnal cough. No wheezing.  No cough with activity.  Also weaning off of Xyzal. Still seeing endocrinologist. Had another bone age a month ago which was normal.  Has another appt in October - considering a growth hormone test.  No infections, no abx use.   Feb 2023: After last visit stopped Advair regularly. Had some minor URI sx that self-resolved.  In Jan 2022 had some mild cough. Saw PMD and he thought Thai sounded tight. Restarted Advair and also gave albuterol x 1 month. Recovered from this cold. Has seemed congested on and off since his last cold. A week ago, came home from hockey and felt tight. Sat on the bench for a few, then went back to practice, and when he went home he said "my asthma is flaring up." Tried 2 puffs, and then another 2. Then tried a combineb which did nothing. Went to [facility] and had some oral steroid, then had a combineb with no improvement.  Normal O2 sat.  He was advised to go to the ED at [facility].  At [place] he had a combineb and that opened him up - pt said "i feel better" He was discharged with 3 days of OCS.   Sometimes takes hockey before hockey - unsure if it helped.   Nov 2022: Pt was seen by marian in October for growth and there are no concerns right now. Bone imaging was normal. One of the markers was low but nothing to do at this time, will follow up again in January. Continued to recommend to avoid steroids.   In September, per our recommendations, pt discontinued the Flovent and switched over to  Advair 45 BID. He has not taken the Advair since October 25th. No recent illnesses, cough, congestion, URI, fevers, or rashes.   Pt takes 2 puffs of albuterol before ice hockey games about once a week. Pt start doing this a few weeks ago after he appeared to be winded after a game. He does not take albuterol before practices.  Yesterday, patient was outside for a little bit was noted to be coughing before bed. Father was going to give albuterol but patient was already asleep, no coughing overnight or today.   Meds: Dymista, Xyzal Vaccines: IUTD, received flu & COVID booster  August 2022: Continued to have very mild obstruction on spirometry and recommended to continue albuterol prn but weaned off of Flovent 110 with instructions to restart if there are any signs of cough or cold and to begin Advair 45 with first signs of illness.   July 13th: adenoidectomy and turbinate shaving -surgery was uneventful and pt was discharged same day. Had nosebleeds a few days later. ENT recommended saline spray and saline gel. Pt has been taking 3 times a day. Nosebleeds stopped.  Saw endocrine due to concerns about growth- blood tests pending - did not seem overly concerned.   No cough, no wheeze, no colds.  Attends summer camp. Engages in hockey, swimming - no limitations.  On Flovent 110, 2 puffs BID. Uses the spacer every time.  No albuterol.   June: Weaned Flovent to 1 puff BID starting May 15th. Playing hockey with no limitation. No cough with hockey. Saw Dr. Carrillo on 6/2 and was given a course of augmentin. Diagnosed with a sinusitis and treated for 10 days with abx.  Cough started 2 days ago. Cough during sleep last night. No fever, no nasal symptoms.  Scheduled for adenoid shaving on 7/13. Needs pre-surgical blood work next week.  Vacation July 6-11 - driving to [place].   He had a viral infection in March - fever, cough, headache. Then he had the Flu a week and a half ago - worse than COVID. Pt had the vaccine. Headache, vomiting, cough, fever to 103. No one in his school is wearing a mask. Tried albuterol a few times in the beginning and it did not help so did not continue. More tired than usual, appetite still not 100%. Still having some cough. Mom thinks hockey triggered it. Took zarbee's last night.   Missed ENT appt as pt had the flu.   Feb: COVID in January - mostly GI sx, severe headache, fever, abdominal cramps. No respiratory symptoms. Lasted 2.5-3 days max. Now back to baseline.  2.5 weeks ago he was playing volleyball and went to the school nurse who gave him 2 puffs. Took albuterol 2 puffs a total of 3 times and then was back to normal. Played volleyball gym 2 days later and was fine. Pacer test in December pre-COVID and felt chest tightness and took 2 puffs which helped. He continues on Flovent 44, 2 puffs BID. Cough a few nights before sleep. No nocturnal awakenings. Plays ice hockey a few times a week and keeps up. Has been playing ice hockey outdoor at night in very temps and has been fine. NO albuterol use with ice hockey.    Sept: Last seen on Aug 30th for spirometry and FeNO. FeNO was 8. He completed 5 days of azithromycin a few days ago. His cough is markedly improved. He continues on Flovent and has not needed any recent albuterol. Still has some nasal congestion but this has improved too. Pt has good energy level and can participate in sports.  Symptoms started to flare yesterday - felt lightheaded and tight. Happened at the end of recess. Went to the school nurse and had 2puffs of albuterol which did nothing. Then went to PMD who gave 60mg of prednisone. Took albuterol 2 puffs at school , then 1 puff at 8PM and then 1 at midnight. Then took 2 puffs at 8AM and 12PM today. Still feels tight. Some rhinorrhea. The night before this all started he had nausea, runny nose, scratchy eyes and headache.   He tried to lower his flovent to 1 puff BID but after 4 days he started to have problems with breathing at recess. School nurse heard some wheezing. Increased Flovent back to 2 puffs BID. 1.5 weeks ago he went to the school nurse again due to trouble breathing after doing relay races. [place]ion some squeaking and school nurse heard some localized wheezing. Currently still playing baseball and ice hockey - no problem. Outfield in baseball. Hockey he takes breaks every 2 minutes. Allergy symptoms have been flaring. Takes dymista and xyzal. Never has sx at home or on the weekends.   April 6th: Since his last visit he had an asthma exacerbation for which he was seen in the ED. He received albuterol puffs and decadron PO. Continued to have cough after this so was seen by PMD and given amoxicillin x 10 days which improved the cough. Father was diagnosed with COVID on 3/19. Thai had 2 rapid tests negative and 1 nasal PCR that was negative. Taking Flovent 110mcg/puff, 2 puffs BID. Last albuterol use was about 5 days before he started to get sick again.  Pt played ice hockey last week and was fine. Has a baseball scrimmage today.  No cough. Some itchy eyes. Some nasal congestion. Still on dymista and xyzal. Meds are helping. Some nausea or dry throat in the AM. Pet cat sleeps with him for some of the night. No dust mite proof covers.   Nov 2020: Thai has been well since his last visit. He continues to do distance learning for school.  He takes flovent 1 puff BID. No nocturnal cough, no activity limitation. No recent albuterol use. Also takes dymista daily and levoceitirizne 2.5mL. Recently started to have some nasal congestion.  June 2020: Last albuterol use was in March. He has been continuing on Flovent 110mcg/puff, 2 puffs BID with a spacer.  With rollerblading and running he has been ok. No coughing with activity. Takes Dymista everyday as well as levocetirizine.  Patient and mother have questions about summer camp, play dates, socialization.  Feb 2020: He had the flu since his last visit - took albuterol during this course. Since he recovered from this he still has had residual cough. He has not stopped coughing since her had the flu a few weeks ago. Went to the school nurse 4 times this week. The school nurse told his mother that she heard wheezing all 4 times that patient presented to her. Seems winded with hockey though he ploughs through the game. He started Advair the day after his last appt and has been completely adherent with it.   January 2020: Last seen in December by Dr. Cunningham who restarted him on Flovent 44. He had been doing well through the fall while off of Flovent but then started to develop wheezing out of the blue in December. Playing gym and doing "turkey trots" caused him to wheeze. Albuterol helped.  Although he has been back on Flovent his asthma seems to be doing worse comaraed to in December. Using albuterol a few times a week for coughing which helps. No nocturnal cough. Sometimes he feels wheezing in his chest. He has been having trouble in gym but not with ice hockey. Mother thinks that when he coughs a lot his color seems to drain from his face.   August, 2019: He was last seen in March at which time he was advised to stop Flovent when the weather changed to the spring.  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 No activity limitation during the sports. He has been having an early morning cough.  Last night he had a very low grade temp.  No wheezing, no albuterol use.  Over the past 2 days he has had some mild cough, low grade fever.  Just went for annual physical.  Takes dymista and levocetirizine daily.  No recent antibiotic use.   March 2019: No flu this year.  2 episodes of "wheezing" noted at school during after school house while running in the gym. He did not receive albuterol at the time. Symptoms subsided within a few minutes. No chest tightness, some cough, no activity limitation. Patient noted that he had some wheezing. He had only 1 minor cold this winter and did not use the ventolin.  He was playing ice hockey and skiing with no problems.  Minimal nasal symptoms while on xyzal but if he comes off of it he has symptoms. Itchy eyes.   November: He has been well since his last visit. He tried to come off of his oral antihistamine for 3 or 4 days and his eyes became puffy. He continues to take Dymista daily. 2 weeks ago he had a nosebleed. Mom continued the Dymista but applied some saline.  About 1 month ago he was in the [employer], was playing tag and developed wheezing. He told his teacher, took water and sat down. Did not take the inhaler and sx resolved.  No cough at night. No activity limitation. He play baseball, football and ice hockey.  He received his flu shot.   Last weekend he had a fever to 101, headache and stomach but these self-resolved.   August, 2018: He had a cold last week with fever. His allergy symptoms have been flaring up this week with nasal congestion and some ocular pruritus. He has been taking dymista and levocetirizine daily since his last visit. He has also been on Flovent since the end of May. He has not required and doses of albuterol. This summer he attended baseball camp, regular camp and wrestling camp. He was able to keep up with his peers and never had any activity limitation. Even prior to the Flovent he never had difficulty with baseball so this is not really a change for him, however the cough has improved dramatically since starting Flovent. Cough initially started in January and was present both day and night. It prevented Thai from falling asleep and was very disruptive in class. This most recent cold was characterized by headache, fever and congestion but no cough.  Overall his mom thinks he is doing much better in regards to cough since starting Flovent.  June 2018: At his last visit he was started on Flovent 44mcg/puff due to persistent cough despite treatment for rhinitis and some bronchodilator reversibility. His cough has considerably improved since starting Flovent. He has been taking Flovent 2 puffs BID with a spacer. Rinses his mouth out after Flovent use. He has no activity limitation and no nocturnal cough. He is at baseball camp and keeps up with peers. Early AM cough has disappeared.  He continues on the Flonase and levocetirizine. No more nasal congestion.  He is stable from a concussion standpoint.  May, 2018:  At his last visit, his nasal smear was positive for eosinophils. IDT positive to cat and mold and the IDT to dust mite became red and itchy the next day. Since his last visit he has been much improved overall from an allergy perspective however he fell down the stairs and has a concussion. His grandfather also just passed away. No more episodes of ocular swelling.  He has episodes of coughing more at night than during the day but also during the day. He has a dry cough that prevents him from falling asleep at times. Sometimes he wakes up with chest tightness and then coughs. Last albuterol use was 3 days ago.   Nasal congestion much better. Some eye itching. Taking levocetirizine nightly as well as flonase.   March, 2018: Since the beginning of February he has had unilateral and bilateral ocular swelling and pruritus. Burning sometimes too. Eyes look "glistening" and he has some clear/whitish drainage. No green drainage. No fever. Episodes of ocular swelling last a few hours and then self- resolve. Occurs more at home but does have symptoms at school. Seen by Dr. Brooks in February and both SPT and immunocap testing were negative to environmental allergens. Seeking any possible further allergy testing. He tried daily zyrtec and took that for the past month. Prior to that he was on claritin daily.  Olopatadine since February - daily. Also on dymista nasal spray since Feb.  Some of the rhinorrhea symptoms have improved but ocular symptoms are the same.   Seen by Dr. Alarcon from ENT who did a rhinoscopy - no nasal polyps but significant nasal congestion. Recommended sinus rinses.   Food allergy: No suspicion for food allergy. Tolerates milk, eggs, wheat, soy, peanut, tree nut, fish and shellfish.  Urticaria - develops hives when he is sick. Occurs a few times a year, not frequently.   Infections - about once or twice a year he takes antibiotics. No recurrent AOM, PNA, bronchitis.  No hospitalizations

## 2025-01-07 NOTE — REVIEW OF SYSTEMS
[Immunizations are up to date] : Immunizations are up to date [Received Influenza Vaccine this Past Year] : Patient has received the Influenza vaccine this past year [COVID-19 Vaccination Complete] : COVID-19 vaccination complete [Fatigue] : no fatigue [Fever] : no fever [Eye Redness] : no redness [Eye Itching] : no itchy eyes [Puffy Eyelids] : no puffy ~T eyelids [Nosebleeds] : no epistaxis [Rhinorrhea] : no rhinorrhea [Nasal Dryness] : no dryness of the nose [Nasal Congestion] : no nasal congestion [Cyanosis] : no cyanosis [Chest Pain] : no chest pain or discomfort [Palpitations] : no palpitations [Difficulty Breathing] : no dyspnea [SOB at Rest] : no shortness of breath at rest [SOB with Exertion] : no dyspnea on exertion [Nocturnal Awakening] : no nocturnal awakening with shortness of breath [Cough] : no cough [Vomiting] : no vomiting [Diarrhea] : no diarrhea [Fainting (Syncope)] : no fainting

## 2025-01-29 NOTE — HISTORY OF PRESENT ILLNESS
[Asthma] : asthma [de-identified] : Thai is a 14 year old boy who presents for follow-up of his asthma after a recent admission.  Diagnosed with flu A on 1/20/25. Pt had fever, cough, body aches, headache. No chest tightness or difficulty breathing but took albuterol a few times over the course of the week with the flu. Returned to hockey a week after onset of flu symptoms and needed 2 rounds of albuterol (before getting on the rink and then during the game).  Again went back to hockey practice 2 days ago and was exhausted but no chest tightness. Missed all of his midterms.      Dec 30th: He has been feeling well with regards to asthma for the past month. Alvesco 160 - 2 puffs BID. No missed doses - sometimes will take AM dose later if on vacation. Since his last exacerbation resolved he has not needed albuterol. Will only reach for it when he feels very tight. Pt demonstrated poor spacer technique today during office visit. He is inhaling but not holding his breath adequately.  Continues to struggle with chronic nasal congestion.  He has been off of Flonase for years. Taking nasal spray only.  Nov 2024: Has used albuterol a few times during hockey in the last few months. A few weeks ago [place] and used his albuterol. 11/19 - was outdoors playing roller hockey and had chest tightness toward the end of playing. Dad picked him up and he said his chest was bothering him. Dad gave him 2 puffs of albuterol with no improvement. Tried a duoneb at home with no improvement so went to [facility].  RVP+ to rhinoenterovirus.   Has had nasal congestion for about 2 months. About once every 2 weeks he missed school due to nasal congestion, some sinus pressure.  Did not take any antibiotics.  Some nausea, abdominal pain and diarrhea.  Parents did not restart Alvesco at any times because they attributed nasal symptoms to allergies rather than a cold.   Hospital Course 13yo M hx of asthma presenting with x1d of increased WOB. Pt has had cough for the last week, and began having difficulty breathing 11/19 at night while playing roller hockey outside. He received 5 puffs of albuterol over an hour at home, and then one albuterol/atrovent nebulizer with no improvement of symptoms, and so parents brought to the ED. Mom states that pt has had intermittent nasal congestion and rhinorrhea over the last 3 weeks. No fevers at home, no diarrhea, no vomiting. No longer takes a control med at home, was weaned off Alvesco this past summer per mom. Prior to that, he has been on and off multiple controller medications for years. Has never been admitted for asthma before, though has had multiple ER visits, last in May 2024.  ED: RSS 11, 3B2B, dex, still diminished and so given Mg + NSB with some improvement. Unable to make past q2h kip, and so admitted on q2 albuterol. RVP R/E+  PMH: asthma Meds: Azelastine nasal spray daily Allergies: seasonal  Floor Course (11/20): Pt arrived to floor in stable condition. Able to wean to q4h albuterol on 11/20. Continued to tolerate PO appropriately. Pediatric pulmonology was contacted during this admission and recommended restarting Alvesco as 160mcg 2 puffs BID at the time of discharge with follow up with pulmonology in 4-6 weeks after discharge. Patient received 2 doses of decadron during this admission for a full course of steroids prior to discharge.  Child continued to tolerate PO with adequate UOP. Child remained well-appearing, with no concerning findings noted on physical exam. Care plan d/w caregivers who endorsed understanding. Anticipatory guidance and strict return precautions d/w caregivers in great detail. Child deemed stable for d/c home w/ recommended PMD f/u in 1-2 days of discharge.  June 2020:  Fall - went back to Dr. Gerardo roldan, started azelastine. Had a bronchoscopy that was relatively normal but some bronchospasm during the procedure. Starting in September he had bad nasal congestion, dark circles,  Not many courses of antibiotics.  Slept with a box of tissues.  Using albuterol rarely. Had a sleepover.      ALvesco started in January - initially 80 and then increased to 160. Symptoms much better.  About 1 week ago he was out and about, felt chest tightness. Parents tried albuterol puffs first and then the combineb. Tried another combineb in the AM and pt still felt wheezing and chest tightness. Went to [facility] air entry - got a dose of decadron and another combineb - still felt tight, second combineb and then he opened up and felt better.  July 2023: Briefly took Advair 115mcg/puff, 2 puffs BID x 2 weeks.then dropped to Advair 45mcg./puff 2 puffs BID and stopped completely April 16th. Sometime at the end of June he had a hockey tournament, was running around afterwards, had a coughing fit as he was laying down which improved with albuterol.  Went to sleep, woke up and played 2 games the next day with no issues. No nocturnal cough. No wheezing.  No cough with activity.  Also weaning off of Xyzal. Still seeing endocrinologist. Had another bone age a month ago which was normal.  Has another appt in October - considering a growth hormone test.  No infections, no abx use.   Feb 2023: After last visit stopped Advair regularly. Had some minor URI sx that self-resolved.  In Jan 2022 had some mild cough. Saw PMD and he thought Thai sounded tight. Restarted Advair and also gave albuterol x 1 month. Recovered from this cold. Has seemed congested on and off since his last cold. A week ago, came home from hockey and felt tight. Sat on the bench for a few, then went back to practice, and when he went home he said "my asthma is flaring up." Tried 2 puffs, and then another 2. Then tried a combineb which did nothing. Went to [facility] and had some oral steroid, then had a combineb with no improvement.  Normal O2 sat.  He was advised to go to the ED at [place].  At [place] he had a combineb and that opened him up - pt said "i feel better" He was discharged with 3 days of OCS.   Sometimes takes hockey before hockey - unsure if it helped.   Nov 2022: Pt was seen by marian in October for growth and there are no concerns right now. Bone imaging was normal. One of the markers was low but nothing to do at this time, will follow up again in January. Continued to recommend to avoid steroids.   In September, per our recommendations, pt discontinued the Flovent and switched over to  Advair 45 BID. He has not taken the Advair since October 25th. No recent illnesses, cough, congestion, URI, fevers, or rashes.   Pt takes 2 puffs of albuterol before ice hockey games about once a week. Pt start doing this a few weeks ago after he appeared to be winded after a game. He does not take albuterol before practices.  Yesterday, patient was outside for a little bit was noted to be coughing before bed. Father was going to give albuterol but patient was already asleep, no coughing overnight or today.   Meds: Dymista, Xyzal Vaccines: IUTD, received flu & COVID booster  August 2022: Continued to have very mild obstruction on spirometry and recommended to continue albuterol prn but weaned off of Flovent 110 with instructions to restart if there are any signs of cough or cold and to begin Advair 45 with first signs of illness.   July 13th: adenoidectomy and turbinate shaving -surgery was uneventful and pt was discharged same day. Had nosebleeds a few days later. ENT recommended saline spray and saline gel. Pt has been taking 3 times a day. Nosebleeds stopped.  Saw endocrine due to concerns about growth- blood tests pending - did not seem overly concerned.   No cough, no wheeze, no colds.  Attends summer camp. Engages in hockey, swimming - no limitations.  On Flovent 110, 2 puffs BID. Uses the spacer every time.  No albuterol.   June: Weaned Flovent to 1 puff BID starting May 15th. Playing hockey with no limitation. No cough with hockey. Saw Dr. Carrillo on 6/2 and was given a course of augmentin. Diagnosed with a sinusitis and treated for 10 days with abx.  Cough started 2 days ago. Cough during sleep last night. No fever, no nasal symptoms.  Scheduled for adenoid shaving on 7/13. Needs pre-surgical blood work next week.  Vacation July 6-11 - driving to [place].   He had a viral infection in March - fever, cough, headache. Then he had the Flu a week and a half ago - worse than COVID. Pt had the vaccine. Headache, vomiting, cough, fever to 103. No one in his school is wearing a mask. Tried albuterol a few times in the beginning and it did not help so did not continue. More tired than usual, appetite still not 100%. Still having some cough. Mom thinks hockey triggered it. Took zarbee's last night.   Missed ENT appt as pt had the flu.   Feb: COVID in January - mostly GI sx, severe headache, fever, abdominal cramps. No respiratory symptoms. Lasted 2.5-3 days max. Now back to baseline.  2.5 weeks ago he was playing volleyball and went to the school nurse who gave him 2 puffs. Took albuterol 2 puffs a total of 3 times and then was back to normal. Played [employer] gym 2 days later and was fine. Pacer test in December pre-COVID and felt chest tightness and took 2 puffs which helped. He continues on Flovent 44, 2 puffs BID. Cough a few nights before sleep. No nocturnal awakenings. Plays ice hockey a few times a week and keeps up. Has been playing ice hockey outdoor at night in very temps and has been fine. NO albuterol use with ice hockey.    Sept: Last seen on Aug 30th for spirometry and FeNO. FeNO was 8. He completed 5 days of azithromycin a few days ago. His cough is markedly improved. He continues on Flovent and has not needed any recent albuterol. Still has some nasal congestion but this has improved too. Pt has good energy level and can participate in sports.  Symptoms started to flare yesterday - felt lightheaded and tight. Happened at the end of recess. Went to the school nurse and had 2puffs of albuterol which did nothing. Then went to PMD who gave 60mg of prednisone. Took albuterol 2 puffs at school , then 1 puff at 8PM and then 1 at midnight. Then took 2 puffs at 8AM and 12PM today. Still feels tight. Some rhinorrhea. The night before this all started he had nausea, runny nose, scratchy eyes and headache.   He tried to lower his flovent to 1 puff BID but after 4 days he started to have problems with breathing at recess. School nurse heard some wheezing. Increased Flovent back to 2 puffs BID. 1.5 weeks ago he went to the school nurse again due to trouble breathing after doing relay races. [place] some squeaking and school nurse heard some localized wheezing. Currently still playing baseball and ice hockey - no problem. Outfield in baseball. Hockey he takes breaks every 2 minutes. Allergy symptoms have been flaring. Takes dymista and xyzal. Never has sx at home or on the weekends.   April 6th: Since his last visit he had an asthma exacerbation for which he was seen in the ED. He received albuterol puffs and decadron PO. Continued to have cough after this so was seen by PMD and given amoxicillin x 10 days which improved the cough. Father was diagnosed with COVID on 3/19. Thai had 2 rapid tests negative and 1 nasal PCR that was negative. Taking Flovent 110mcg/puff, 2 puffs BID. Last albuterol use was about 5 days before he started to get sick again.  Pt played ice hockey last week and was fine. Has a baseball scrimmage today.  No cough. Some itchy eyes. Some nasal congestion. Still on dymista and xyzal. Meds are helping. Some nausea or dry throat in the AM. Pet cat sleeps with him for some of the night. No dust mite proof covers.   Nov 2020: Thai has been well since his last visit. He continues to do distance learning for school.  He takes flovent 1 puff BID. No nocturnal cough, no activity limitation. No recent albuterol use. Also takes dymista daily and levoceitirizne 2.5mL. Recently started to have some nasal congestion.  June 2020: Last albuterol use was in March. He has been continuing on Flovent 110mcg/puff, 2 puffs BID with a spacer.  With rollerblading and running he has been ok. No coughing with activity. Takes Dymista everyday as well as levocetirizine.  Patient and mother have questions about summer camp, play dates, socialization.  Feb 2020: He had the flu since his last visit - took albuterol during this course. Since he recovered from this he still has had residual cough. He has not stopped coughing since her had the flu a few weeks ago. Went to the school nurse 4 times this week. The school nurse told his mother that she heard wheezing all 4 times that patient presented to her. Seems winded with hockey though he ploughs through the game. He started Advair the day after his last appt and has been completely adherent with it.   January 2020: Last seen in December by Dr. Cunningham who restarted him on Flovent 44. He had been doing well through the fall while off of Flovent but then started to develop wheezing out of the blue in December. Playing gym and doing "turkey trots" caused him to wheeze. Albuterol helped.  Although he has been back on Flovent his asthma seems to be doing worse comaraed to in December. Using albuterol a few times a week for coughing which helps. No nocturnal cough. Sometimes he feels wheezing in his chest. He has been having trouble in gym but not with ice hockey. Mother thinks that when he coughs a lot his color seems to drain from his face.   August, 2019: He was last seen in March at which time he was advised to stop Flovent when the weather changed to the spring.  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 No activity limitation during the sports. He has been having an early morning cough.  Last night he had a very low grade temp.  No wheezing, no albuterol use.  Over the past 2 days he has had some mild cough, low grade fever.  Just went for annual physical.  Takes dymista and levocetirizine daily.  No recent antibiotic use.   March 2019: No flu this year.  2 episodes of "wheezing" noted at school during after school house while running in the gym. He did not receive albuterol at the time. Symptoms subsided within a few minutes. No chest tightness, some cough, no activity limitation. Patient noted that he had some wheezing. He had only 1 minor cold this winter and did not use the ventolin.  He was playing ice hockey and skiing with no problems.  Minimal nasal symptoms while on xyzal but if he comes off of it he has symptoms. Itchy eyes.   November: He has been well since his last visit. He tried to come off of his oral antihistamine for 3 or 4 days and his eyes became puffy. He continues to take Dymista daily. 2 weeks ago he had a nosebleed. Mom continued the Dymista but applied some saline.  About 1 month ago he was in the [employer], was playing tag and developed wheezing. He told his teacher, took water and sat down. Did not take the inhaler and sx resolved.  No cough at night. No activity limitation. He play baseball, football and ice hockey.  He received his flu shot.   Last weekend he had a fever to 101, headache and stomach but these self-resolved.   August, 2018: He had a cold last week with fever. His allergy symptoms have been flaring up this week with nasal congestion and some ocular pruritus. He has been taking dymista and levocetirizine daily since his last visit. He has also been on Flovent since the end of May. He has not required and doses of albuterol. This summer he attended baseball camp, regular camp and wrestling camp. He was able to keep up with his peers and never had any activity limitation. Even prior to the Flovent he never had difficulty with baseball so this is not really a change for him, however the cough has improved dramatically since starting Flovent. Cough initially started in January and was present both day and night. It prevented Thai from falling asleep and was very disruptive in class. This most recent cold was characterized by headache, fever and congestion but no cough.  Overall his mom thinks he is doing much better in regards to cough since starting Flovent.  June 2018: At his last visit he was started on Flovent 44mcg/puff due to persistent cough despite treatment for rhinitis and some bronchodilator reversibility. His cough has considerably improved since starting Flovent. He has been taking Flovent 2 puffs BID with a spacer. Rinses his mouth out after Flovent use. He has no activity limitation and no nocturnal cough. He is at baseball camp and keeps up with peers. Early AM cough has disappeared.  He continues on the Flonase and levocetirizine. No more nasal congestion.  He is stable from a concussion standpoint.  May, 2018:  At his last visit, his nasal smear was positive for eosinophils. IDT positive to cat and mold and the IDT to dust mite became red and itchy the next day. Since his last visit he has been much improved overall from an allergy perspective however he fell down the stairs and has a concussion. His grandfather also just passed away. No more episodes of ocular swelling.  He has episodes of coughing more at night than during the day but also during the day. He has a dry cough that prevents him from falling asleep at times. Sometimes he wakes up with chest tightness and then coughs. Last albuterol use was 3 days ago.   Nasal congestion much better. Some eye itching. Taking levocetirizine nightly as well as flonase.   March, 2018: Since the beginning of February he has had unilateral and bilateral ocular swelling and pruritus. Burning sometimes too. Eyes look "glistening" and he has some clear/whitish drainage. No green drainage. No fever. Episodes of ocular swelling last a few hours and then self- resolve. Occurs more at home but does have symptoms at school. Seen by Dr. Brooks in February and both SPT and immunocap testing were negative to environmental allergens. Seeking any possible further allergy testing. He tried daily zyrtec and took that for the past month. Prior to that he was on claritin daily.  Olopatadine since February - daily. Also on dymista nasal spray since Feb.  Some of the rhinorrhea symptoms have improved but ocular symptoms are the same.   Seen by Dr. Alarcon from ENT who did a rhinoscopy - no nasal polyps but significant nasal congestion. Recommended sinus rinses.   Food allergy: No suspicion for food allergy. Tolerates milk, eggs, wheat, soy, peanut, tree nut, fish and shellfish.  Urticaria - develops hives when he is sick. Occurs a few times a year, not frequently.   Infections - about once or twice a year he takes antibiotics. No recurrent AOM, PNA, bronchitis.  No hospitalizations

## 2025-01-29 NOTE — PHYSICAL EXAM

## 2025-02-20 NOTE — HISTORY OF PRESENT ILLNESS
[de-identified] : This is a 14-year-old male with asthma and allergic rhinitis presenting for a follow up.  Patient presents today for first loading dose of Dupixent, indicated for poorly-controlled asthma. He will receive 400mg SubQ once today as loading dose - 2 x 200mg injections. Subsequently to receive 200mg SubQ every 2 weeks.  Dupixent Lot#: [id number] Exp: 05/2027

## 2025-02-20 NOTE — REASON FOR VISIT
[Routine Follow-Up] : a routine follow-up visit for [Parents] : parents [FreeTextEntry2] : Dupixent loading dose

## 2025-04-02 NOTE — REASON FOR VISIT
[Routine Follow-Up] : a routine follow-up visit for [FreeTextEntry3] : chronic rhinitis [Father] : father

## 2025-04-02 NOTE — PHYSICAL EXAM

## 2025-04-02 NOTE — HISTORY OF PRESENT ILLNESS
[Asthma] : asthma [de-identified] : Thai is a 15 year old boy who presents for a sick visit in the setting of asthma.  He started Dupixent on Feb 20th. Has been taking injections q2 weeks at home without issues. Shortly after starting the dupixent his chronic nasal congestion started to improve.  He has been feeling well in general and taking Alvesco 160, 2 puffs BID. 2 days ago started to develop nasal congestion and a dry cough. Took albuterol 2 puffs before bed, then the next day still had cough, went to school, was still coughing, then went to hockey practice. After playing hockey his chest felt tight and he was coughing. Took albuterol 4 puffs with some mild improvement in sx. This AM woke up with continued chest tightness. Currently has a dry cough, nasal congestion, and his chest feels tight. No fever. States that multiple students in his school have COVID.  Jan 2025: Diagnosed with flu A on 1/20/25. Pt had fever, cough, body aches, headache. No chest tightness or difficulty breathing but took albuterol a few times over the course of the week with the flu. Returned to hockey a week after onset of flu symptoms and needed 2 rounds of albuterol (before getting on the rink and then during the game).  Again went back to hockey practice 2 days ago and was exhausted but no chest tightness. Missed all of his midterms.   Continues taking Alvesco BID + spiriva. Parents are on board with starting dupixent.  Dec 30th: He has been feeling well with regards to asthma for the past month. Alvesco 160 - 2 puffs BID. No missed doses - sometimes will take AM dose later if on vacation. Since his last exacerbation resolved he has not needed albuterol. Will only reach for it when he feels very tight. Pt demonstrated poor spacer technique today during office visit. He is inhaling but not holding his breath adequately.  Continues to struggle with chronic nasal congestion.  He has been off of Flonase for years. Taking nasal spray only.  Nov 2024: Has used albuterol a few times during hockey in the last few months. A few weeks ago he was playing ice hockey in [place] and used his albuterol. 11/19 - was outdoors playing roller hockey and had chest tightness toward the end of playing. Dad picked him up and he said his chest was bothering him. Dad gave him 2 puffs of albuterol with no improvement. Tried a duoneb at home with no improvement [place] [facility] [place]  RVP+ to rhinoenterovirus.   Has had nasal congestion for about 2 months. About once every 2 weeks he missed school due to nasal congestion, some sinus pressure.  Did not take any antibiotics.  Some nausea, abdominal pain and diarrhea.  Parents did not restart Alvesco at any times because they attributed nasal symptoms to allergies rather than a cold.   Hospital Course 15yo M hx of asthma presenting with x1d of increased WOB. Pt has had cough for the last week, and began having difficulty breathing 11/19 at night while playing roller hockey outside. He received 5 puffs of albuterol over an hour at home, and then one albuterol/atrovent nebulizer with no improvement of symptoms, and so parents brought to the ED. Mom states that pt has had intermittent nasal congestion and rhinorrhea over the last 3 weeks. No fevers at home, no diarrhea, no vomiting. No longer takes a control med at home, was weaned off Alvesco this past summer per mom. Prior to that, he has been on and off multiple controller medications for years. Has never been admitted for asthma before, though has had multiple ER visits, last in May 2024.  ED: RSS 11, 3B2B, dex, still diminished and so given Mg + NSB with some improvement. Unable to make past q2h kip, and so admitted on q2 albuterol. RVP R/E+  PMH: asthma Meds: Azelastine nasal spray daily Allergies: seasonal  Floor Course (11/20): Pt arrived to floor in stable condition. Able to wean to q4h albuterol on 11/20. Continued to tolerate PO appropriately. Pediatric pulmonology was contacted during this admission and recommended restarting Alvesco as 160mcg 2 puffs BID at the time of discharge with follow up with pulmonology in 4-6 weeks after discharge. Patient received 2 doses of decadron during this admission for a full course of steroids prior to discharge.  Child continued to tolerate PO with adequate UOP. Child remained well-appearing, with no concerning findings noted on physical exam. Care plan d/w caregivers who endorsed understanding. Anticipatory guidance and strict return precautions d/w caregivers in great detail. Child deemed stable for d/c home w/ recommended PMD f/u in 1-2 days of discharge.  June 2020:  Fall - went back to Dr. Gerardo roldan, started azelastine. Had a bronchoscopy that was relatively normal but some bronchospasm during the procedure. Starting in September he had bad nasal congestion, dark circles,  Not many courses of antibiotics.  Slept with a box of tissues.  Using albuterol rarely. Had a sleepover.      ALvesco started in January - initially 80 and then increased to 160. Symptoms much better.  About 1 week ago he was out and about, felt chest tightness. Parents tried albuterol puffs first and then the combineb. Tried another combineb in the AM and pt still felt wheezing and chest tightness. Went to [facility] - poor air entry - got a dose of decadron and another combineb - still felt tight, second combineb and then he opened up and felt better.  July 2023: Briefly took Advair 115mcg/puff, 2 puffs BID x 2 weeks.then dropped to Advair 45mcg./puff 2 puffs BID and stopped completely April 16th. Sometime at the end of June he had a hockey tournament, was running around afterwards, had a coughing fit as he was laying down which improved with albuterol.  Went to sleep, woke up and played 2 games the next day with no issues. No nocturnal cough. No wheezing.  No cough with activity.  Also weaning off of Xyzal. Still seeing endocrinologist. Had another bone age a month ago which was normal.  Has another appt in October - considering a growth hormone test.  No infections, no abx use.   Feb 2023: After last visit stopped Advair regularly. Had some minor URI sx that self-resolved.  In Jan 2022 had some mild cough. Saw PMD and he thought Thai sounded tight. Restarted Advair and also gave albuterol x 1 month. Recovered from this cold. Has seemed congested on and off since his last cold. A week ago, came home from hockey and felt tight. Sat on the bench for a few, then went back to practice, and when he went home he said "my asthma is flaring up." Tried 2 puffs, and then another 2. Then tried a combineb which did nothing. Went to [facility] and had some oral steroid, then had a combineb with no improvement.  Normal O2 sat.  He was advised to go to the ED at [facility].  At [facility] he had a combineb and that opened him up - pt said "i feel better" He was discharged with 3 days of OCS.   Sometimes takes hockey before hockey - unsure if it helped.   Nov 2022: Pt was seen by marian in October for growth and there are no concerns right now. Bone imaging was normal. One of the markers was low but nothing to do at this time, will follow up again in January. Continued to recommend to avoid steroids.   In September, per our recommendations, pt discontinued the Flovent and switched over to  Advair 45 BID. He has not taken the Advair since October 25th. No recent illnesses, cough, congestion, URI, fevers, or rashes.   Pt takes 2 puffs of albuterol before ice hockey games about once a week. Pt start doing this a few weeks ago after he appeared to be winded after a game. He does not take albuterol before practices.  Yesterday, patient was outside for a little bit was noted to be coughing before bed. Father was going to give albuterol but patient was already asleep, no coughing overnight or today.   Meds: Dymista, Xyzal Vaccines: IUTD, received flu & COVID booster  August 2022: Continued to have very mild obstruction on spirometry and recommended to continue albuterol prn but weaned off of Flovent 110 with instructions to restart if there are any signs of cough or cold and to begin Advair 45 with first signs of illness.   July 13th: adenoidectomy and turbinate shaving -surgery was uneventful and pt was discharged same day. Had nosebleeds a few days later. ENT recommended saline spray and saline gel. Pt has been taking 3 times a day. Nosebleeds stopped.  Saw endocrine due to concerns about growth- blood tests pending - did not seem overly concerned.   No cough, no wheeze, no colds.  Attends summer camp. Engages in hockey, swimming - no limitations.  On Flovent 110, 2 puffs BID. Uses the spacer every time.  No albuterol.   June: Weaned Flovent to 1 puff BID starting May 15th. Playing hockey with no limitation. No cough with hockey. Saw Dr. Carrillo on 6/2 and was given a course of augmentin. Diagnosed with a sinusitis and treated for 10 days with abx.  Cough started 2 days ago. Cough during sleep last night. No fever, no nasal symptoms.  Scheduled for adenoid shaving on 7/13. Needs pre-surgical blood work next week.  Vacation July 6-11 - driving to [place].   He had a viral infection in March - fever, cough, headache. Then he had the Flu a week and a half ago - worse than COVID. Pt had the vaccine. Headache, vomiting, cough, fever to 103. No one in his school is wearing a mask. Tried albuterol a few times in the beginning and it did not help so did not continue. More tired than usual, appetite still not 100%. Still having some cough. Mom thinks hockey triggered it. Took zarbee's last night.   Missed ENT appt as pt had the flu.   Feb: COVID in January - mostly GI sx, severe headache, fever, abdominal cramps. No respiratory symptoms. Lasted 2.5-3 days max. Now back to baseline.  2.5 weeks ago he was playing volleyball and went to the school nurse who gave him 2 puffs. Took albuterol 2 puffs a total of 3 times and then was back to normal. Played [employer] gym 2 days later and was fine. Pacer test in December pre-COVID and felt chest tightness and took 2 puffs which helped. He continues on Flovent 44, 2 puffs BID. Cough a few nights before sleep. No nocturnal awakenings. Plays ice hockey a few times a week and keeps up. Has been playing ice hockey outdoor at night in very temps and has been fine. NO albuterol use with ice hockey.    Sept: Last seen on Aug 30th for spirometry and FeNO. FeNO was 8. He completed 5 days of azithromycin a few days ago. His cough is markedly improved. He continues on Flovent and has not needed any recent albuterol. Still has some nasal congestion but this has improved too. Pt has good energy level and can participate in sports.  Symptoms started to flare yesterday - felt lightheaded and tight. Happened at the end of recess. Went to the school nurse and had 2puffs of albuterol which did nothing. Then went to PMD who gave 60mg of prednisone. Took albuterol 2 puffs at school , then 1 puff at 8PM and then 1 at midnight. Then took 2 puffs at 8AM and 12PM today. Still feels tight. Some rhinorrhea. The night before this all started he had nausea, runny nose, scratchy eyes and headache.   He tried to lower his flovent to 1 puff BID but after 4 days he started to have problems with breathing at recess. School nurse heard some wheezing. Increased Flovent back to 2 puffs BID. 1.5 weeks ago he went to the school nurse again due to trouble breathing after doing relay races. Burnet some squeaking and school nurse heard some localized wheezing. Currently still playing baseball and ice hockey - no problem. Outfield in baseball. Hockey he takes breaks every 2 minutes. Allergy symptoms have been flaring. Takes dymista and xyzal. Never has sx at home or on the weekends.   April 6th: Since his last visit he had an asthma exacerbation for which he was seen in the ED. He received albuterol puffs and decadron PO. Continued to have cough after this so was seen by PMD and given amoxicillin x 10 days which improved the cough. Father was diagnosed with COVID on 3/19. Thai had 2 rapid tests negative and 1 nasal PCR that was negative. Taking Flovent 110mcg/puff, 2 puffs BID. Last albuterol use was about 5 days before he started to get sick again.  Pt played ice hockey last week and was fine. Has a baseball scrimmage today.  No cough. Some itchy eyes. Some nasal congestion. Still on dymista and xyzal. Meds are helping. Some nausea or dry throat in the AM. Pet cat sleeps with him for some of the night. No dust mite proof covers.   Nov 2020: Thai has been well since his last visit. He continues to do distance learning for school.  He takes flovent 1 puff BID. No nocturnal cough, no activity limitation. No recent albuterol use. Also takes dymista daily and levoceitirizne 2.5mL. Recently started to have some nasal congestion.  June 2020: Last albuterol use was in March. He has been continuing on Flovent 110mcg/puff, 2 puffs BID with a spacer.  With rollerblading and running he has been ok. No coughing with activity. Takes Dymista everyday as well as levocetirizine.  Patient and mother have questions about summer camp, play dates, socialization.  Feb 2020: He had the flu since his last visit - took albuterol during this course. Since he recovered from this he still has had residual cough. He has not stopped coughing since her had the flu a few weeks ago. Went to the school nurse 4 times this week. The school nurse told his mother that she heard wheezing all 4 times that patient presented to her. Seems winded with hockey though he ploughs through the game. He started Advair the day after his last appt and has been completely adherent with it.   January 2020: Last seen in December by Dr. Cunningham who restarted him on Flovent 44. He had been doing well through the fall while off of Flovent but then started to develop wheezing out of the blue in December. Playing gym and doing "turkey trots" caused him to wheeze. Albuterol helped.  Although he has been back on Flovent his asthma seems to be doing worse comaraed to in December. Using albuterol a few times a week for coughing which helps. No nocturnal cough. Sometimes he feels wheezing in his chest. He has been having trouble in gym but not with ice hockey. Mother thinks that when he coughs a lot his color seems to drain from his face.   August, 2019: He was last seen in March at which time he was advised to stop Flovent when the weather changed to the spring.  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 No activity limitation during the sports. He has been having an early morning cough.  Last night he had a very low grade temp.  No wheezing, no albuterol use.  Over the past 2 days he has had some mild cough, low grade fever.  Just went for annual physical.  Takes dymista and levocetirizine daily.  No recent antibiotic use.   March 2019: No flu this year.  2 episodes of "wheezing" noted at school during after school house while running in the gym. He did not receive albuterol at the time. Symptoms subsided within a few minutes. No chest tightness, some cough, no activity limitation. Patient noted that he had some wheezing. He had only 1 minor cold this winter and did not use the ventolin.  He was playing ice hockey and skiing with no problems.  Minimal nasal symptoms while on xyzal but if he comes off of it he has symptoms. Itchy eyes.   November: He has been well since his last visit. He tried to come off of his oral antihistamine for 3 or 4 days and his eyes became puffy. He continues to take Dymista daily. 2 weeks ago he had a nosebleed. Mom continued the Dymista but applied some saline.  About 1 month ago he was in the [employer], was playing tag and developed wheezing. He told his teacher, took water and sat down. Did not take the inhaler and sx resolved.  No cough at night. No activity limitation. He play baseball, football and ice hockey.  He received his flu shot.   Last weekend he had a fever to 101, headache and stomach but these self-resolved.   August, 2018: He had a cold last week with fever. His allergy symptoms have been flaring up this week with nasal congestion and some ocular pruritus. He has been taking dymista and levocetirizine daily since his last visit. He has also been on Flovent since the end of May. He has not required and doses of albuterol. This summer he attended baseball camp, regular camp and wrestling camp. He was able to keep up with his peers and never had any activity limitation. Even prior to the Flovent he never had difficulty with baseball so this is not really a change for him, however the cough has improved dramatically since starting Flovent. Cough initially started in January and was present both day and night. It prevented Thai from falling asleep and was very disruptive in class. This most recent cold was characterized by headache, fever and congestion but no cough.  Overall his mom thinks he is doing much better in regards to cough since starting Flovent.  June 2018: At his last visit he was started on Flovent 44mcg/puff due to persistent cough despite treatment for rhinitis and some bronchodilator reversibility. His cough has considerably improved since starting Flovent. He has been taking Flovent 2 puffs BID with a spacer. Rinses his mouth out after Flovent use. He has no activity limitation and no nocturnal cough. He is at baseball camp and keeps up with peers. Early AM cough has disappeared.  He continues on the Flonase and levocetirizine. No more nasal congestion.  He is stable from a concussion standpoint.  May, 2018:  At his last visit, his nasal smear was positive for eosinophils. IDT positive to cat and mold and the IDT to dust mite became red and itchy the next day. Since his last visit he has been much improved overall from an allergy perspective however he fell down the stairs and has a concussion. His grandfather also just passed away. No more episodes of ocular swelling.  He has episodes of coughing more at night than during the day but also during the day. He has a dry cough that prevents him from falling asleep at times. Sometimes he wakes up with chest tightness and then coughs. Last albuterol use was 3 days ago.   Nasal congestion much better. Some eye itching. Taking levocetirizine nightly as well as flonase.   March, 2018: Since the beginning of February he has had unilateral and bilateral ocular swelling and pruritus. Burning sometimes too. Eyes look "glistening" and he has some clear/whitish drainage. No green drainage. No fever. Episodes of ocular swelling last a few hours and then self- resolve. Occurs more at home but does have symptoms at school. Seen by Dr. Brooks in February and both SPT and immunocap testing were negative to environmental allergens. Seeking any possible further allergy testing. He tried daily zyrtec and took that for the past month. Prior to that he was on claritin daily.  Olopatadine since February - daily. Also on dymista nasal spray since Feb.  Some of the rhinorrhea symptoms have improved but ocular symptoms are the same.   Seen by Dr. Alarcon from ENT who did a rhinoscopy - no nasal polyps but significant nasal congestion. Recommended sinus rinses.   Food allergy: No suspicion for food allergy. Tolerates milk, eggs, wheat, soy, peanut, tree nut, fish and shellfish.  Urticaria - develops hives when he is sick. Occurs a few times a year, not frequently.   Infections - about once or twice a year he takes antibiotics. No recurrent AOM, PNA, bronchitis.  No hospitalizations

## 2025-04-02 NOTE — REVIEW OF SYSTEMS
[Fatigue] : no fatigue [Fever] : no fever [Eye Redness] : no redness [Eye Itching] : no itchy eyes [Puffy Eyelids] : no puffy ~T eyelids [Nosebleeds] : no epistaxis [Rhinorrhea] : no rhinorrhea [Nasal Dryness] : no dryness of the nose [Nasal Congestion] : no nasal congestion [Cyanosis] : no cyanosis [Chest Pain] : no chest pain or discomfort [Palpitations] : no palpitations [Difficulty Breathing] : no dyspnea [SOB at Rest] : no shortness of breath at rest [SOB with Exertion] : no dyspnea on exertion [Nocturnal Awakening] : no nocturnal awakening with shortness of breath [Cough] : no cough [Vomiting] : no vomiting [Diarrhea] : no diarrhea [Fainting (Syncope)] : no fainting [Immunizations are up to date] : Immunizations are up to date [Received Influenza Vaccine this Past Year] : Patient has received the Influenza vaccine this past year [COVID-19 Vaccination Complete] : COVID-19 vaccination complete

## 2025-04-02 NOTE — HISTORY OF PRESENT ILLNESS
[Asthma] : asthma [de-identified] : Thai is a 15 year old boy who presents for a sick visit in the setting of asthma.  He started Dupixent on Feb 20th. Has been taking injections q2 weeks at home without issues. Shortly after starting the dupixent his chronic nasal congestion started to improve.  He has been feeling well in general and taking Alvesco 160, 2 puffs BID. 2 days ago started to develop nasal congestion and a dry cough. Took albuterol 2 puffs before bed, then the next day still had cough, went to school, was still coughing, then went to hockey practice. After playing hockey his chest felt tight and he was coughing. Took albuterol 4 puffs with some mild improvement in sx. This AM woke up with continued chest tightness. Currently has a dry cough, nasal congestion, and his chest feels tight. No fever. States that multiple students in his school have COVID.  Jan 2025: Diagnosed with flu A on 1/20/25. Pt had fever, cough, body aches, headache. No chest tightness or difficulty breathing but took albuterol a few times over the course of the week with the flu. Returned to hockey a week after onset of flu symptoms and needed 2 rounds of albuterol (before getting on the rink and then during the game).  Again went back to hockey practice 2 days ago and was exhausted but no chest tightness. Missed all of his midterms.   Continues taking Alvesco BID + spiriva. Parents are on board with starting dupixent.  Dec 30th: He has been feeling well with regards to asthma for the past month. Alvesco 160 - 2 puffs BID. No missed doses - sometimes will take AM dose later if on vacation. Since his last exacerbation resolved he has not needed albuterol. Will only reach for it when he feels very tight. Pt demonstrated poor spacer technique today during office visit. He is inhaling but not holding his breath adequately.  Continues to struggle with chronic nasal congestion.  He has been off of Flonase for years. Taking nasal spray only.  Nov 2024: Has used albuterol a few times during hockey in the last few months. A few weeks ago he was playing ice hockey in [employer] and used his albuterol. 11/19 - was outdoors playing roller hockey and had chest tightness toward the end of playing. Dad picked him up and he said his chest was bothering him. Dad gave him 2 puffs of albuterol with no improvement. Tried a duoneb at home with no improvement so went to [facility].  RVP+ to rhinoenterovirus.   Has had nasal congestion for about 2 months. About once every 2 weeks he missed school due to nasal congestion, some sinus pressure.  Did not take any antibiotics.  Some nausea, abdominal pain and diarrhea.  Parents did not restart Alvesco at any times because they attributed nasal symptoms to allergies rather than a cold.   Hospital Course 15yo M hx of asthma presenting with x1d of increased WOB. Pt has had cough for the last week, and began having difficulty breathing 11/19 at night while playing roller hockey outside. He received 5 puffs of albuterol over an hour at home, and then one albuterol/atrovent nebulizer with no improvement of symptoms, and so parents brought to the ED. Mom states that pt has had intermittent nasal congestion and rhinorrhea over the last 3 weeks. No fevers at home, no diarrhea, no vomiting. No longer takes a control med at home, was weaned off Alvesco this past summer per mom. Prior to that, he has been on and off multiple controller medications for years. Has never been admitted for asthma before, though has had multiple ER visits, last in May 2024.  ED: RSS 11, 3B2B, dex, still diminished and so given Mg + NSB with some improvement. Unable to make past q2h kip, and so admitted on q2 albuterol. RVP R/E+  PMH: asthma Meds: Azelastine nasal spray daily Allergies: seasonal  Floor Course (11/20): Pt arrived to floor in stable condition. Able to wean to q4h albuterol on 11/20. Continued to tolerate PO appropriately. Pediatric pulmonology was contacted during this admission and recommended restarting Alvesco as 160mcg 2 puffs BID at the time of discharge with follow up with pulmonology in 4-6 weeks after discharge. Patient received 2 doses of decadron during this admission for a full course of steroids prior to discharge.  Child continued to tolerate PO with adequate UOP. Child remained well-appearing, with no concerning findings noted on physical exam. Care plan d/w caregivers who endorsed understanding. Anticipatory guidance and strict return precautions d/w caregivers in great detail. Child deemed stable for d/c home w/ recommended PMD f/u in 1-2 days of discharge.  June 2020:  Fall - went back to Dr. Gerardo roldan, started azelastine. Had a bronchoscopy that was relatively normal but some bronchospasm during the procedure. Starting in September he had bad nasal congestion, dark circles,  Not many courses of antibiotics.  Slept with a box of tissues.  Using albuterol rarely. Had a sleepover.      ALvesco started in January - initially 80 and then increased to 160. Symptoms much better.  About 1 week ago he was out and about, felt chest tightness. Parents tried albuterol puffs first and then the combineb. Tried another combineb in the AM and pt still felt wheezing and chest tightness. Went to [place] - poor air entry - got a dose of decadron and another combineb - still felt tight, second combineb and then he opened up and felt better.  July 2023: Briefly took Advair 115mcg/puff, 2 puffs BID x 2 weeks.then dropped to Advair 45mcg./puff 2 puffs BID and stopped completely April 16th. Sometime at the end of June he had a hockey tournament, was running around afterwards, had a coughing fit as he was laying down which improved with albuterol.  Went to sleep, woke up and played 2 games the next day with no issues. No nocturnal cough. No wheezing.  No cough with activity.  Also weaning off of Xyzal. Still seeing endocrinologist. Had another bone age a month ago which was normal.  Has another appt in October - considering a growth hormone test.  No infections, no abx use.   Feb 2023: After last visit stopped Advair regularly. Had some minor URI sx that self-resolved.  In Jan 2022 had some mild cough. Saw PMD and he thought Thai sounded tight. Restarted Advair and also gave albuterol x 1 month. Recovered from this cold. Has seemed congested on and off since his last cold. A week ago, came home from hockey and felt tight. Sat on the bench for a few, then went back to practice, and when he went home he said "my asthma is flaring up." Tried 2 puffs, and then another 2. Then tried a combineb which did nothing. Went to [facility] and had some oral steroid, then had a combineb with no improvement.  Normal O2 sat.  He was advised to go to the ED at [place].  At [place] he had a combineb and that opened him up - pt said "i feel better" He was discharged with 3 days of OCS.   Sometimes takes hockey before hockey - unsure if it helped.   Nov 2022: Pt was seen by marian in October for growth and there are no concerns right now. Bone imaging was normal. One of the markers was low but nothing to do at this time, will follow up again in January. Continued to recommend to avoid steroids.   In September, per our recommendations, pt discontinued the Flovent and switched over to  Advair 45 BID. He has not taken the Advair since October 25th. No recent illnesses, cough, congestion, URI, fevers, or rashes.   Pt takes 2 puffs of albuterol before ice hockey games about once a week. Pt start doing this a few weeks ago after he appeared to be winded after a game. He does not take albuterol before practices.  Yesterday, patient was outside for a little bit was noted to be coughing before bed. Father was going to give albuterol but patient was already asleep, no coughing overnight or today.   Meds: Dymista, Xyzal Vaccines: IUTD, received flu & COVID booster  August 2022: Continued to have very mild obstruction on spirometry and recommended to continue albuterol prn but weaned off of Flovent 110 with instructions to restart if there are any signs of cough or cold and to begin Advair 45 with first signs of illness.   July 13th: adenoidectomy and turbinate shaving -surgery was uneventful and pt was discharged same day. Had nosebleeds a few days later. ENT recommended saline spray and saline gel. Pt has been taking 3 times a day. Nosebleeds stopped.  Saw endocrine due to concerns about growth- blood tests pending - did not seem overly concerned.   No cough, no wheeze, no colds.  Attends summer camp. Engages in hockey, swimming - no limitations.  On Flovent 110, 2 puffs BID. Uses the spacer every time.  No albuterol.   June: Weaned Flovent to 1 puff BID starting May 15th. Playing hockey with no limitation. No cough with hockey. Saw Dr. Carrillo on 6/2 and was given a course of augmentin. Diagnosed with a sinusitis and treated for 10 days with abx.  Cough started 2 days ago. Cough during sleep last night. No fever, no nasal symptoms.  Scheduled for adenoid shaving on 7/13. Needs pre-surgical blood work next week.  Vacation July 6-11 - driving to [place].   He had a viral infection in March - fever, cough, headache. Then he had the Flu a week and a half ago - worse than COVID. Pt had the vaccine. Headache, vomiting, cough, fever to 103. No one in his school is wearing a mask. Tried albuterol a few times in the beginning and it did not help so did not continue. More tired than usual, appetite still not 100%. Still having some cough. Mom thinks hockey triggered it. Took zarbee's last night.   Missed ENT appt as pt had the flu.   Feb: COVID in January - mostly GI sx, severe headache, fever, abdominal cramps. No respiratory symptoms. Lasted 2.5-3 days max. Now back to baseline.  2.5 weeks ago he was playing volleyball and went to the school nurse who gave him 2 puffs. Took albuterol 2 puffs a total of 3 times and then was back to normal. Played [employer] gym 2 days later and was fine. Pacer test in December pre-COVID and felt chest tightness and took 2 puffs which helped. He continues on Flovent 44, 2 puffs BID. Cough a few nights before sleep. No nocturnal awakenings. Plays ice hockey a few times a week and keeps up. Has been playing ice hockey outdoor at night in very temps and has been fine. NO albuterol use with ice hockey.    Sept: Last seen on Aug 30th for spirometry and FeNO. FeNO was 8. He completed 5 days of azithromycin a few days ago. His cough is markedly improved. He continues on Flovent and has not needed any recent albuterol. Still has some nasal congestion but this has improved too. Pt has good energy level and can participate in sports.  Symptoms started to flare yesterday - felt lightheaded and tight. Happened at the end of recess. Went to the school nurse and had 2puffs of albuterol which did nothing. Then went to PMD who gave 60mg of prednisone. Took albuterol 2 puffs at school , then 1 puff at 8PM and then 1 at midnight. Then took 2 puffs at 8AM and 12PM today. Still feels tight. Some rhinorrhea. The night before this all started he had nausea, runny nose, scratchy eyes and headache.   He tried to lower his flovent to 1 puff BID but after 4 days he started to have problems with breathing at recess. School nurse heard some wheezing. Increased Flovent back to 2 puffs BID. 1.5 weeks ago he went to the school nurse again due to trouble breathing after doing relay races. [place] some squeaking and school nurse heard some localized wheezing. Currently still playing baseball and ice hockey - no problem. Outfield in baseball. Hockey he takes breaks every 2 minutes. Allergy symptoms have been flaring. Takes dymista and xyzal. Never has sx at home or on the weekends.   April 6th: Since his last visit he had an asthma exacerbation for which he was seen in the ED. He received albuterol puffs and decadron PO. Continued to have cough after this so was seen by PMD and given amoxicillin x 10 days which improved the cough. Father was diagnosed with COVID on 3/19. Thai had 2 rapid tests negative and 1 nasal PCR that was negative. Taking Flovent 110mcg/puff, 2 puffs BID. Last albuterol use was about 5 days before he started to get sick again.  Pt played ice hockey last week and was fine. Has a baseball scrimmage today.  No cough. Some itchy eyes. Some nasal congestion. Still on dymista and xyzal. Meds are helping. Some nausea or dry throat in the AM. Pet cat sleeps with him for some of the night. No dust mite proof covers.   Nov 2020: Thai has been well since his last visit. He continues to do distance learning for school.  He takes flovent 1 puff BID. No nocturnal cough, no activity limitation. No recent albuterol use. Also takes dymista daily and levoceitirizne 2.5mL. Recently started to have some nasal congestion.  June 2020: Last albuterol use was in March. He has been continuing on Flovent 110mcg/puff, 2 puffs BID with a spacer.  With rollerblading and running he has been ok. No coughing with activity. Takes Dymista everyday as well as levocetirizine.  Patient and mother have questions about summer camp, play dates, socialization.  Feb 2020: He had the flu since his last visit - took albuterol during this course. Since he recovered from this he still has had residual cough. He has not stopped coughing since her had the flu a few weeks ago. Went to the school nurse 4 times this week. The school nurse told his mother that she heard wheezing all 4 times that patient presented to her. Seems winded with hockey though he ploughs through the game. He started Advair the day after his last appt and has been completely adherent with it.   January 2020: Last seen in December by Dr. Cunningham who restarted him on Flovent 44. He had been doing well through the fall while off of Flovent but then started to develop wheezing out of the blue in December. Playing gym and doing "turkey trots" caused him to wheeze. Albuterol helped.  Although he has been back on Flovent his asthma seems to be doing worse comaraed to in December. Using albuterol a few times a week for coughing which helps. No nocturnal cough. Sometimes he feels wheezing in his chest. He has been having trouble in gym but not with ice hockey. Mother thinks that when he coughs a lot his color seems to drain from his face.   August, 2019: He was last seen in March at which time he was advised to stop Flovent when the weather changed to the spring.  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 No activity limitation during the sports. He has been having an early morning cough.  Last night he had a very low grade temp.  No wheezing, no albuterol use.  Over the past 2 days he has had some mild cough, low grade fever.  Just went for annual physical.  Takes dymista and levocetirizine daily.  No recent antibiotic use.   March 2019: No flu this year.  2 episodes of "wheezing" noted at school during after school house while running in the gym. He did not receive albuterol at the time. Symptoms subsided within a few minutes. No chest tightness, some cough, no activity limitation. Patient noted that he had some wheezing. He had only 1 minor cold this winter and did not use the ventolin.  He was playing ice hockey and skiing with no problems.  Minimal nasal symptoms while on xyzal but if he comes off of it he has symptoms. Itchy eyes.   November: He has been well since his last visit. He tried to come off of his oral antihistamine for 3 or 4 days and his eyes became puffy. He continues to take Dymista daily. 2 weeks ago he had a nosebleed. Mom continued the Dymista but applied some saline.  About 1 month ago he was in the [employer], was playing tag and developed wheezing. He told his teacher, took water and sat down. Did not take the inhaler and sx resolved.  No cough at night. No activity limitation. He play baseball, football and ice hockey.  He received his flu shot.   Last weekend he had a fever to 101, headache and stomach but these self-resolved.   August, 2018: He had a cold last week with fever. His allergy symptoms have been flaring up this week with nasal congestion and some ocular pruritus. He has been taking dymista and levocetirizine daily since his last visit. He has also been on Flovent since the end of May. He has not required and doses of albuterol. This summer he attended baseball camp, regular camp and wrestling camp. He was able to keep up with his peers and never had any activity limitation. Even prior to the Flovent he never had difficulty with baseball so this is not really a change for him, however the cough has improved dramatically since starting Flovent. Cough initially started in January and was present both day and night. It prevented Thai from falling asleep and was very disruptive in class. This most recent cold was characterized by headache, fever and congestion but no cough.  Overall his mom thinks he is doing much better in regards to cough since starting Flovent.  June 2018: At his last visit he was started on Flovent 44mcg/puff due to persistent cough despite treatment for rhinitis and some bronchodilator reversibility. His cough has considerably improved since starting Flovent. He has been taking Flovent 2 puffs BID with a spacer. Rinses his mouth out after Flovent use. He has no activity limitation and no nocturnal cough. He is at baseball camp and keeps up with peers. Early AM cough has disappeared.  He continues on the Flonase and levocetirizine. No more nasal congestion.  He is stable from a concussion standpoint.  May, 2018:  At his last visit, his nasal smear was positive for eosinophils. IDT positive to cat and mold and the IDT to dust mite became red and itchy the next day. Since his last visit he has been much improved overall from an allergy perspective however he fell down the stairs and has a concussion. His grandfather also just passed away. No more episodes of ocular swelling.  He has episodes of coughing more at night than during the day but also during the day. He has a dry cough that prevents him from falling asleep at times. Sometimes he wakes up with chest tightness and then coughs. Last albuterol use was 3 days ago.   Nasal congestion much better. Some eye itching. Taking levocetirizine nightly as well as flonase.   March, 2018: Since the beginning of February he has had unilateral and bilateral ocular swelling and pruritus. Burning sometimes too. Eyes look "glistening" and he has some clear/whitish drainage. No green drainage. No fever. Episodes of ocular swelling last a few hours and then self- resolve. Occurs more at home but does have symptoms at school. Seen by Dr. Brooks in February and both SPT and immunocap testing were negative to environmental allergens. Seeking any possible further allergy testing. He tried daily zyrtec and took that for the past month. Prior to that he was on claritin daily.  Olopatadine since February - daily. Also on dymista nasal spray since Feb.  Some of the rhinorrhea symptoms have improved but ocular symptoms are the same.   Seen by Dr. Alarcon from ENT who did a rhinoscopy - no nasal polyps but significant nasal congestion. Recommended sinus rinses.   Food allergy: No suspicion for food allergy. Tolerates milk, eggs, wheat, soy, peanut, tree nut, fish and shellfish.  Urticaria - develops hives when he is sick. Occurs a few times a year, not frequently.   Infections - about once or twice a year he takes antibiotics. No recurrent AOM, PNA, bronchitis.  No hospitalizations

## 2025-04-02 NOTE — PHYSICAL EXAM

## 2025-07-27 NOTE — HISTORY OF PRESENT ILLNESS
[Asthma] : asthma [de-identified] : Thai is a 15 year old boy with asthma, on dupixent since Feb 2025, who presents for follow-up.  Some nasal congestion in the AM only. Much better compared to prior. No cough, wheeze, chest tightness. Alvesco 2 puffs BID Still on spiriva. Last albuterol use was 2 months ago.  April 2025: He started Dupixent on Feb 20th. Has been taking injections q2 weeks at home without issues. Shortly after starting the dupixent his chronic nasal congestion started to improve.  He has been feeling well in general and taking Alvesco 160, 2 puffs BID. 2 days ago started to develop nasal congestion and a dry cough. Took albuterol 2 puffs before bed, then the next day still had cough, went to school, was still coughing, then went to hockey practice. After playing hockey his chest felt tight and he was coughing. Took albuterol 4 puffs with some mild improvement in sx. This AM woke up with continued chest tightness. Currently has a dry cough, nasal congestion, and his chest feels tight. No fever. States that multiple students in his school have COVID.  Jan 2025: Diagnosed with flu A on 1/20/25. Pt had fever, cough, body aches, headache. No chest tightness or difficulty breathing but took albuterol a few times over the course of the week with the flu. Returned to hockey a week after onset of flu symptoms and needed 2 rounds of albuterol (before getting on the rink and then during the game).  Again went back to hockey practice 2 days ago and was exhausted but no chest tightness. Missed all of his midterms.   Continues taking Alvesco BID + spiriva. Parents are on board with starting dupixent.  Dec 30th: He has been feeling well with regards to asthma for the past month. Alvesco 160 - 2 puffs BID. No missed doses - sometimes will take AM dose later if on vacation. Since his last exacerbation resolved he has not needed albuterol. Will only reach for it when he feels very tight. Pt demonstrated poor spacer technique today during office visit. He is inhaling but not holding his breath adequately.  Continues to struggle with chronic nasal congestion.  He has been off of Flonase for years. Taking nasal spray only.  Nov 2024: Has used albuterol a few times during hockey in the last few months. A few weeks ago he was playing ice hockey in [employer] and used his albuterol. 11/19 - was outdoors playing roller hockey and had chest tightness toward the end of playing. Dad picked him up and he said his chest was bothering him. Dad gave him 2 puffs of albuterol with no improvement. Tried a duoneb at home with no improvement so went to [facility].  RVP+ to rhinoenterovirus.   Has had nasal congestion for about 2 months. About once every 2 weeks he missed school due to nasal congestion, some sinus pressure.  Did not take any antibiotics.  Some nausea, abdominal pain and diarrhea.  Parents did not restart Alvesco at any times because they attributed nasal symptoms to allergies rather than a cold.   Hospital Course 15yo M hx of asthma presenting with x1d of increased WOB. Pt has had cough for the last week, and began having difficulty breathing 11/19 at night while playing roller hockey outside. He received 5 puffs of albuterol over an hour at home, and then one albuterol/atrovent nebulizer with no improvement of symptoms, and so parents brought to the ED. Mom states that pt has had intermittent nasal congestion and rhinorrhea over the last 3 weeks. No fevers at home, no diarrhea, no vomiting. No longer takes a control med at home, was weaned off Alvesco this past summer per mom. Prior to that, he has been on and off multiple controller medications for years. Has never been admitted for asthma before, though has had multiple ER visits, last in May 2024.  ED: RSS 11, 3B2B, dex, still diminished and so given Mg + NSB with some improvement. Unable to make past q2h kip, and so admitted on q2 albuterol. RVP R/E+  PMH: asthma Meds: Azelastine nasal spray daily Allergies: seasonal  Floor Course (11/20): Pt arrived to floor in stable condition. Able to wean to q4h albuterol on 11/20. Continued to tolerate PO appropriately. Pediatric pulmonology was contacted during this admission and recommended restarting Alvesco as 160mcg 2 puffs BID at the time of discharge with follow up with pulmonology in 4-6 weeks after discharge. Patient received 2 doses of decadron during this admission for a full course of steroids prior to discharge.  Child continued to tolerate PO with adequate UOP. Child remained well-appearing, with no concerning findings noted on physical exam. Care plan d/w caregivers who endorsed understanding. Anticipatory guidance and strict return precautions d/w caregivers in great detail. Child deemed stable for d/c home w/ recommended PMD f/u in 1-2 days of discharge.  June 2020:  Fall - went back to Dr. Gerardo mendez'brian roldan, started azelastine. Had a bronchoscopy that was relatively normal but some bronchospasm during the procedure. Starting in September he had bad nasal congestion, dark circles,  Not many courses of antibiotics.  Slept with a box of tissues.  Using albuterol rarely. Had a sleepover.      ALvesco started in January - initially 80 and then increased to 160. Symptoms much better.  About 1 week ago he was out and about, felt chest tightness. Parents tried albuterol puffs first and then the combineb. Tried another combineb in the AM and pt still felt wheezing and chest tightness. Went to [facility] - poor air entry - got a dose of decadron and another combineb - still felt tight, second combineb and then he opened up and felt better.  July 2023: Briefly took Advair 115mcg/puff, 2 puffs BID x 2 weeks.then dropped to Advair 45mcg./puff 2 puffs BID and stopped completely April 16th. Sometime at the end of June he had a hockey tournament, was running around afterwards, had a coughing fit as he was laying down which improved with albuterol.  Went to sleep, woke up and played 2 games the next day with no issues. No nocturnal cough. No wheezing.  No cough with activity.  Also weaning off of Xyzal. Still seeing endocrinologist. Had another bone age a month ago which was normal.  Has another appt in October - considering a growth hormone test.  No infections, no abx use.   Feb 2023: After last visit stopped Advair regularly. Had some minor URI sx that self-resolved.  In Jan 2022 had some mild cough. Saw PMD and he thought Thai sounded tight. Restarted Advair and also gave albuterol x 1 month. Recovered from this cold. Has seemed congested on and off since his last cold. A week ago, came home from hockey and felt tight. Sat on the bench for a few, then went back to practice, and when he went home he said "my asthma is flaring up." Tried 2 puffs, and then another 2. Then tried a combineb which did nothing. Went to [facility] and had some oral steroid, then had a combineb with no improvement.  Normal O2 sat.  He was advised to go to the ED at [facility].  At [facility] he had a combineb and that opened him up - pt said "i feel better" He was discharged with 3 days of OCS.   Sometimes takes hockey before hockey - unsure if it helped.   Nov 2022: Pt was seen by marian in October for growth and there are no concerns right now. Bone imaging was normal. One of the markers was low but nothing to do at this time, will follow up again in January. Continued to recommend to avoid steroids.   In September, per our recommendations, pt discontinued the Flovent and switched over to  Advair 45 BID. He has not taken the Advair since October 25th. No recent illnesses, cough, congestion, URI, fevers, or rashes.   Pt takes 2 puffs of albuterol before ice hockey games about once a week. Pt start doing this a few weeks ago after he appeared to be winded after a game. He does not take albuterol before practices.  Yesterday, patient was outside for a little bit was noted to be coughing before bed. Father was going to give albuterol but patient was already asleep, no coughing overnight or today.   Meds: Dymista, Xyzal Vaccines: IUTD, received flu & COVID booster  August 2022: Continued to have very mild obstruction on spirometry and recommended to continue albuterol prn but weaned off of Flovent 110 with instructions to restart if there are any signs of cough or cold and to begin Advair 45 with first signs of illness.   July 13th: adenoidectomy and turbinate shaving -surgery was uneventful and pt was discharged same day. Had nosebleeds a few days later. ENT recommended saline spray and saline gel. Pt has been taking 3 times a day. Nosebleeds stopped.  Saw endocrine due to concerns about growth- blood tests pending - did not seem overly concerned.   No cough, no wheeze, no colds.  Attends summer camp. Engages in hockey, swimming - no limitations.  On Flovent 110, 2 puffs BID. Uses the spacer every time.  No albuterol.   Deanne: Weaned Flovent to 1 puff BID starting May 15th. Playing hockey with no limitation. No cough with hockey. Saw Dr. Carrillo on 6/2 and was given a course of augmentin. Diagnosed with a sinusitis and treated for 10 days with abx.  Cough started 2 days ago. Cough during sleep last night. No fever, no nasal symptoms.  Scheduled for adenoid shaving on 7/13. Needs pre-surgical blood work next week.  Vacation July 6-11 - driving to [place].   He had a viral infection in March - fever, cough, headache. Then he had the Flu a week and a half ago - worse than COVID. Pt had the vaccine. Headache, vomiting, cough, fever to 103. No one in his school is wearing a mask. Tried albuterol a few times in the beginning and it did not help so did not continue. More tired than usual, appetite still not 100%. Still having some cough. Mom thinks hockey triggered it. Took zarbee's last night.   Missed ENT appt as pt had the flu.   Feb: COVID in January - mostly GI sx, severe headache, fever, abdominal cramps. No respiratory symptoms. Lasted 2.5-3 days max. Now back to baseline.  2.5 weeks ago he was playing volleyball and went to the school nurse who gave him 2 puffs. Took albuterol 2 puffs a total of 3 times and then was back to normal. Played [employer] gym 2 days later and was fine. Pacer test in December pre-COVID and felt chest tightness and took 2 puffs which helped. He continues on Flovent 44, 2 puffs BID. Cough a few nights before sleep. No nocturnal awakenings. Plays ice hockey a few times a week and keeps up. Has been playing ice hockey outdoor at night in very temps and has been fine. NO albuterol use with ice hockey.    Sept: Last seen on Aug 30th for spirometry and FeNO. FeNO was 8. He completed 5 days of azithromycin a few days ago. His cough is markedly improved. He continues on Flovent and has not needed any recent albuterol. Still has some nasal congestion but this has improved too. Pt has good energy level and can participate in sports.  Symptoms started to flare yesterday - felt lightheaded and tight. Happened at the end of recess. Went to the school nurse and had 2puffs of albuterol which did nothing. Then went to PMD who gave 60mg of prednisone. Took albuterol 2 puffs at school , then 1 puff at 8PM and then 1 at midnight. Then took 2 puffs at 8AM and 12PM today. Still feels tight. Some rhinorrhea. The night before this all started he had nausea, runny nose, scratchy eyes and headache.   He tried to lower his flovent to 1 puff BID but after 4 days he started to have problems with breathing at recess. School nurse heard some wheezing. Increased Flovent back to 2 puffs BID. 1.5 weeks ago he went to the school nurse again due to trouble breathing after doing relay races. [place] some squeaking and school nurse heard some localized wheezing. Currently still playing baseball and ice hockey - no problem. Outfield in baseball. Hockey he takes breaks every 2 minutes. Allergy symptoms have been flaring. Takes dymista and xyzal. Never has sx at home or on the weekends.   April 6th: Since his last visit he had an asthma exacerbation for which he was seen in the ED. He received albuterol puffs and decadron PO. Continued to have cough after this so was seen by PMD and given amoxicillin x 10 days which improved the cough. Father was diagnosed with COVID on 3/19. Thai had 2 rapid tests negative and 1 nasal PCR that was negative. Taking Flovent 110mcg/puff, 2 puffs BID. Last albuterol use was about 5 days before he started to get sick again.  Pt played ice hockey last week and was fine. Has a baseball scrimmage today.  No cough. Some itchy eyes. Some nasal congestion. Still on dymista and xyzal. Meds are helping. Some nausea or dry throat in the AM. Pet cat sleeps with him for some of the night. No dust mite proof covers.   Nov 2020: Thai has been well since his last visit. He continues to do distance learning for school.  He takes flovent 1 puff BID. No nocturnal cough, no activity limitation. No recent albuterol use. Also takes dymista daily and levoceitirizne 2.5mL. Recently started to have some nasal congestion.  June 2020: Last albuterol use was in March. He has been continuing on Flovent 110mcg/puff, 2 puffs BID with a spacer.  With rollerblading and running he has been ok. No coughing with activity. Takes Dymista everyday as well as levocetirizine.  Patient and mother have questions about summer camp, play dates, socialization.  Feb 2020: He had the flu since his last visit - took albuterol during this course. Since he recovered from this he still has had residual cough. He has not stopped coughing since her had the flu a few weeks ago. Went to the school nurse 4 times this week. The school nurse told his mother that she heard wheezing all 4 times that patient presented to her. Seems winded with hockey though he ploughs through the game. He started Advair the day after his last appt and has been completely adherent with it.   January 2020: Last seen in December by Dr. Cunningham who restarted him on Flovent 44. He had been doing well through the fall while off of Flovent but then started to develop wheezing out of the blue in December. Playing gym and doing "turkey trots" caused him to wheeze. Albuterol helped.  Although he has been back on Flovent his asthma seems to be doing worse comaraed to in December. Using albuterol a few times a week for coughing which helps. No nocturnal cough. Sometimes he feels wheezing in his chest. He has been having trouble in gym but not with ice hockey. Mother thinks that when he coughs a lot his color seems to drain from his face.   August, 2019: He was last seen in March at which time he was advised to stop Flovent when the weather changed to the spring.  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 No activity limitation during the sports. He has been having an early morning cough.  Last night he had a very low grade temp.  No wheezing, no albuterol use.  Over the past 2 days he has had some mild cough, low grade fever.  Just went for annual physical.  Takes dymista and levocetirizine daily.  No recent antibiotic use.   March 2019: No flu this year.  2 episodes of "wheezing" noted at school during after school house while running in the gym. He did not receive albuterol at the time. Symptoms subsided within a few minutes. No chest tightness, some cough, no activity limitation. Patient noted that he had some wheezing. He had only 1 minor cold this winter and did not use the ventolin.  He was playing ice hockey and skiing with no problems.  Minimal nasal symptoms while on xyzal but if he comes off of it he has symptoms. Itchy eyes.   November: He has been well since his last visit. He tried to come off of his oral antihistamine for 3 or 4 days and his eyes became puffy. He continues to take Dymista daily. 2 weeks ago he had a nosebleed. Mom continued the Dymista but applied some saline.  About 1 month ago he was in the [employer], was playing tag and developed wheezing. He told his teacher, took water and sat down. Did not take the inhaler and sx resolved.  No cough at night. No activity limitation. He play baseball, football and ice hockey.  He received his flu shot.   Last weekend he had a fever to 101, headache and stomach but these self-resolved.   August, 2018: He had a cold last week with fever. His allergy symptoms have been flaring up this week with nasal congestion and some ocular pruritus. He has been taking dymista and levocetirizine daily since his last visit. He has also been on Flovent since the end of May. He has not required and doses of albuterol. This summer he attended baseball camp, regular camp and wrestling camp. He was able to keep up with his peers and never had any activity limitation. Even prior to the Flovent he never had difficulty with baseball so this is not really a change for him, however the cough has improved dramatically since starting Flovent. Cough initially started in January and was present both day and night. It prevented Thai from falling asleep and was very disruptive in class. This most recent cold was characterized by headache, fever and congestion but no cough.  Overall his mom thinks he is doing much better in regards to cough since starting Flovent.  June 2018: At his last visit he was started on Flovent 44mcg/puff due to persistent cough despite treatment for rhinitis and some bronchodilator reversibility. His cough has considerably improved since starting Flovent. He has been taking Flovent 2 puffs BID with a spacer. Rinses his mouth out after Flovent use. He has no activity limitation and no nocturnal cough. He is at baseball camp and keeps up with peers. Early AM cough has disappeared.  He continues on the Flonase and levocetirizine. No more nasal congestion.  He is stable from a concussion standpoint.  May, 2018:  At his last visit, his nasal smear was positive for eosinophils. IDT positive to cat and mold and the IDT to dust mite became red and itchy the next day. Since his last visit he has been much improved overall from an allergy perspective however he fell down the stairs and has a concussion. His grandfather also just passed away. No more episodes of ocular swelling.  He has episodes of coughing more at night than during the day but also during the day. He has a dry cough that prevents him from falling asleep at times. Sometimes he wakes up with chest tightness and then coughs. Last albuterol use was 3 days ago.   Nasal congestion much better. Some eye itching. Taking levocetirizine nightly as well as flonase.   March, 2018: Since the beginning of February he has had unilateral and bilateral ocular swelling and pruritus. Burning sometimes too. Eyes look "glistening" and he has some clear/whitish drainage. No green drainage. No fever. Episodes of ocular swelling last a few hours and then self- resolve. Occurs more at home but does have symptoms at school. Seen by Dr. Brooks in February and both SPT and immunocap testing were negative to environmental allergens. Seeking any possible further allergy testing. He tried daily zyrtec and took that for the past month. Prior to that he was on claritin daily.  Olopatadine since February - daily. Also on dymista nasal spray since Feb.  Some of the rhinorrhea symptoms have improved but ocular symptoms are the same.   Seen by Dr. Alarcon from ENT who did a rhinoscopy - no nasal polyps but significant nasal congestion. Recommended sinus rinses.   Food allergy: No suspicion for food allergy. Tolerates milk, eggs, wheat, soy, peanut, tree nut, fish and shellfish.  Urticaria - develops hives when he is sick. Occurs a few times a year, not frequently.   Infections - about once or twice a year he takes antibiotics. No recurrent AOM, PNA, bronchitis.  No hospitalizations

## 2025-07-27 NOTE — PHYSICAL EXAM

## 2025-07-27 NOTE — PHYSICAL EXAM

## 2025-07-27 NOTE — HISTORY OF PRESENT ILLNESS
[Asthma] : asthma [de-identified] : Thai is a 15 year old boy with asthma, on dupixent since Feb 2025, who presents for follow-up.  Some nasal congestion in the AM only. Much better compared to prior. No cough, wheeze, chest tightness. Alvesco 2 puffs BID Still on spiriva. Last albuterol use was 2 months ago.  April 2025: He started Dupixent on Feb 20th. Has been taking injections q2 weeks at home without issues. Shortly after starting the dupixent his chronic nasal congestion started to improve.  He has been feeling well in general and taking Alvesco 160, 2 puffs BID. 2 days ago started to develop nasal congestion and a dry cough. Took albuterol 2 puffs before bed, then the next day still had cough, went to school, was still coughing, then went to hockey practice. After playing hockey his chest felt tight and he was coughing. Took albuterol 4 puffs with some mild improvement in sx. This AM woke up with continued chest tightness. Currently has a dry cough, nasal congestion, and his chest feels tight. No fever. States that multiple students in his school have COVID.  Jan 2025: Diagnosed with flu A on 1/20/25. Pt had fever, cough, body aches, headache. No chest tightness or difficulty breathing but took albuterol a few times over the course of the week with the flu. Returned to hockey a week after onset of flu symptoms and needed 2 rounds of albuterol (before getting on the rink and then during the game).  Again went back to hockey practice 2 days ago and was exhausted but no chest tightness. Missed all of his midterms.   Continues taking Alvesco BID + spiriva. Parents are on board with starting dupixent.  Dec 30th: He has been feeling well with regards to asthma for the past month. Alvesco 160 - 2 puffs BID. No missed doses - sometimes will take AM dose later if on vacation. Since his last exacerbation resolved he has not needed albuterol. Will only reach for it when he feels very tight. Pt demonstrated poor spacer technique today during office visit. He is inhaling but not holding his breath adequately.  Continues to struggle with chronic nasal congestion.  He has been off of Flonase for years. Taking nasal spray only.  Nov 2024: Has used albuterol a few times during hockey in the last few months. A few weeks ago he was playing ice hockey in [place] and used his albuterol. 11/19 - was outdoors playing roller hockey and had chest tightness toward the end of playing. Dad picked him up and he said his chest was bothering him. Dad gave him 2 puffs of albuterol with no improvement. Tried a duoneb at home with no improvement so went to [facility].  RVP+ to rhinoenterovirus.   Has had nasal congestion for about 2 months. About once every 2 weeks he missed school due to nasal congestion, some sinus pressure.  Did not take any antibiotics.  Some nausea, abdominal pain and diarrhea.  Parents did not restart Alvesco at any times because they attributed nasal symptoms to allergies rather than a cold.   Hospital Course 13yo M hx of asthma presenting with x1d of increased WOB. Pt has had cough for the last week, and began having difficulty breathing 11/19 at night while playing roller hockey outside. He received 5 puffs of albuterol over an hour at home, and then one albuterol/atrovent nebulizer with no improvement of symptoms, and so parents brought to the ED. Mom states that pt has had intermittent nasal congestion and rhinorrhea over the last 3 weeks. No fevers at home, no diarrhea, no vomiting. No longer takes a control med at home, was weaned off Alvesco this past summer per mom. Prior to that, he has been on and off multiple controller medications for years. Has never been admitted for asthma before, though has had multiple ER visits, last in May 2024.  ED: RSS 11, 3B2B, dex, still diminished and so given Mg + NSB with some improvement. Unable to make past q2h kip, and so admitted on q2 albuterol. RVP R/E+  PMH: asthma Meds: Azelastine nasal spray daily Allergies: seasonal  Floor Course (11/20): Pt arrived to floor in stable condition. Able to wean to q4h albuterol on 11/20. Continued to tolerate PO appropriately. Pediatric pulmonology was contacted during this admission and recommended restarting Alvesco as 160mcg 2 puffs BID at the time of discharge with follow up with pulmonology in 4-6 weeks after discharge. Patient received 2 doses of decadron during this admission for a full course of steroids prior to discharge.  Child continued to tolerate PO with adequate UOP. Child remained well-appearing, with no concerning findings noted on physical exam. Care plan d/w caregivers who endorsed understanding. Anticipatory guidance and strict return precautions d/w caregivers in great detail. Child deemed stable for d/c home w/ recommended PMD f/u in 1-2 days of discharge.  June 2020:  Fall - went back to Dr. Gerardo mendez'brian roldan, started azelastine. Had a bronchoscopy that was relatively normal but some bronchospasm during the procedure. Starting in September he had bad nasal congestion, dark circles,  Not many courses of antibiotics.  Slept with a box of tissues.  Using albuterol rarely. Had a sleepover.      ALvesco started in January - initially 80 and then increased to 160. Symptoms much better.  About 1 week ago he was out and about, felt chest tightness. Parents tried albuterol puffs first and then the combineb. Tried another combineb in the AM and pt still felt wheezing and chest tightness. Went to [place] - poor air entry - got a dose of decadron and another combineb - still felt tight, second combineb and then he opened up and felt better.  July 2023: Briefly took Advair 115mcg/puff, 2 puffs BID x 2 weeks.then dropped to Advair 45mcg./puff 2 puffs BID and stopped completely April 16th. Sometime at the end of June he had a hockey tournament, was running around afterwards, had a coughing fit as he was laying down which improved with albuterol.  Went to sleep, woke up and played 2 games the next day with no issues. No nocturnal cough. No wheezing.  No cough with activity.  Also weaning off of Xyzal. Still seeing endocrinologist. Had another bone age a month ago which was normal.  Has another appt in October - considering a growth hormone test.  No infections, no abx use.   Feb 2023: After last visit stopped Advair regularly. Had some minor URI sx that self-resolved.  In Jan 2022 had some mild cough. Saw PMD and he thought Thai sounded tight. Restarted Advair and also gave albuterol x 1 month. Recovered from this cold. Has seemed congested on and off since his last cold. A week ago, came home from hockey and felt tight. Sat on the bench for a few, then went back to practice, and when he went home he said "my asthma is flaring up." Tried 2 puffs, and then another 2. Then tried a combineb which did nothing. Went to [facility] and had some oral steroid, then had a combineb with no improvement.  Normal O2 sat.  He was advised to go to the ED at [place].  At [place] he had a combineb and that opened him up - pt said "i feel better" He was discharged with 3 days of OCS.   Sometimes takes hockey before hockey - unsure if it helped.   Nov 2022: Pt was seen by marian in October for growth and there are no concerns right now. Bone imaging was normal. One of the markers was low but nothing to do at this time, will follow up again in January. Continued to recommend to avoid steroids.   In September, per our recommendations, pt discontinued the Flovent and switched over to  Advair 45 BID. He has not taken the Advair since October 25th. No recent illnesses, cough, congestion, URI, fevers, or rashes.   Pt takes 2 puffs of albuterol before ice hockey games about once a week. Pt start doing this a few weeks ago after he appeared to be winded after a game. He does not take albuterol before practices.  Yesterday, patient was outside for a little bit was noted to be coughing before bed. Father was going to give albuterol but patient was already asleep, no coughing overnight or today.   Meds: Dymista, Xyzal Vaccines: IUTD, received flu & COVID booster  August 2022: Continued to have very mild obstruction on spirometry and recommended to continue albuterol prn but weaned off of Flovent 110 with instructions to restart if there are any signs of cough or cold and to begin Advair 45 with first signs of illness.   July 13th: adenoidectomy and turbinate shaving -surgery was uneventful and pt was discharged same day. Had nosebleeds a few days later. ENT recommended saline spray and saline gel. Pt has been taking 3 times a day. Nosebleeds stopped.  Saw endocrine due to concerns about growth- blood tests pending - did not seem overly concerned.   No cough, no wheeze, no colds.  Attends summer camp. Engages in hockey, swimming - no limitations.  On Flovent 110, 2 puffs BID. Uses the spacer every time.  No albuterol.   Deanne: Weaned Flovent to 1 puff BID starting May 15th. Playing hockey with no limitation. No cough with hockey. Saw Dr. Carrillo on 6/2 and was given a course of augmentin. Diagnosed with a sinusitis and treated for 10 days with abx.  Cough started 2 days ago. Cough during sleep last night. No fever, no nasal symptoms.  Scheduled for adenoid shaving on 7/13. Needs pre-surgical blood work next week.  Vacation July 6-11 - driving to [place].   He had a viral infection in March - fever, cough, headache. Then he had the Flu a week and a half ago - worse than COVID. Pt had the vaccine. Headache, vomiting, cough, fever to 103. No one in his school is wearing a mask. Tried albuterol a few times in the beginning and it did not help so did not continue. More tired than usual, appetite still not 100%. Still having some cough. Mom thinks hockey triggered it. Took zarbee's last night.   Missed ENT appt as pt had the flu.   Feb: COVID in January - mostly GI sx, severe headache, fever, abdominal cramps. No respiratory symptoms. Lasted 2.5-3 days max. Now back to baseline.  2.5 weeks ago he was playing volleyball and went to the school nurse who gave him 2 puffs. Took albuterol 2 puffs a total of 3 times and then was back to normal. Played [employer] gym 2 days later and was fine. Pacer test in December pre-COVID and felt chest tightness and took 2 puffs which helped. He continues on Flovent 44, 2 puffs BID. Cough a few nights before sleep. No nocturnal awakenings. Plays ice hockey a few times a week and keeps up. Has been playing ice hockey outdoor at night in very temps and has been fine. NO albuterol use with ice hockey.    Sept: Last seen on Aug 30th for spirometry and FeNO. FeNO was 8. He completed 5 days of azithromycin a few days ago. His cough is markedly improved. He continues on Flovent and has not needed any recent albuterol. Still has some nasal congestion but this has improved too. Pt has good energy level and can participate in sports.  Symptoms started to flare yesterday - felt lightheaded and tight. Happened at the end of recess. Went to the school nurse and had 2puffs of albuterol which did nothing. Then went to PMD who gave 60mg of prednisone. Took albuterol 2 puffs at school , then 1 puff at 8PM and then 1 at midnight. Then took 2 puffs at 8AM and 12PM today. Still feels tight. Some rhinorrhea. The night before this all started he had nausea, runny nose, scratchy eyes and headache.   He tried to lower his flovent to 1 puff BID but after 4 days he started to have problems with breathing at recess. School nurse heard some wheezing. Increased Flovent back to 2 puffs BID. 1.5 weeks ago he went to the school nurse again due to trouble breathing after doing relay races. [place] some squeaking and school nurse heard some localized wheezing. Currently still playing baseball and ice hockey - no problem. Outfield in baseball. Hockey he takes breaks every 2 minutes. Allergy symptoms have been flaring. Takes dymista and xyzal. Never has sx at home or on the weekends.   April 6th: Since his last visit he had an asthma exacerbation for which he was seen in the ED. He received albuterol puffs and decadron PO. Continued to have cough after this so was seen by PMD and given amoxicillin x 10 days which improved the cough. Father was diagnosed with COVID on 3/19. Thai had 2 rapid tests negative and 1 nasal PCR that was negative. Taking Flovent 110mcg/puff, 2 puffs BID. Last albuterol use was about 5 days before he started to get sick again.  Pt played ice hockey last week and was fine. Has a baseball scrimmage today.  No cough. Some itchy eyes. Some nasal congestion. Still on dymista and xyzal. Meds are helping. Some nausea or dry throat in the AM. Pet cat sleeps with him for some of the night. No dust mite proof covers.   Nov 2020: Thai has been well since his last visit. He continues to do distance learning for school.  He takes flovent 1 puff BID. No nocturnal cough, no activity limitation. No recent albuterol use. Also takes dymista daily and levoceitirizne 2.5mL. Recently started to have some nasal congestion.  June 2020: Last albuterol use was in March. He has been continuing on Flovent 110mcg/puff, 2 puffs BID with a spacer.  With rollerblading and running he has been ok. No coughing with activity. Takes Dymista everyday as well as levocetirizine.  Patient and mother have questions about summer camp, play dates, socialization.  Feb 2020: He had the flu since his last visit - took albuterol during this course. Since he recovered from this he still has had residual cough. He has not stopped coughing since her had the flu a few weeks ago. Went to the school nurse 4 times this week. The school nurse told his mother that she heard wheezing all 4 times that patient presented to her. Seems winded with hockey though he ploughs through the game. He started Advair the day after his last appt and has been completely adherent with it.   January 2020: Last seen in December by Dr. Cunningham who restarted him on Flovent 44. He had been doing well through the fall while off of Flovent but then started to develop wheezing out of the blue in December. Playing gym and doing "turkey trots" caused him to wheeze. Albuterol helped.  Although he has been back on Flovent his asthma seems to be doing worse comaraed to in December. Using albuterol a few times a week for coughing which helps. No nocturnal cough. Sometimes he feels wheezing in his chest. He has been having trouble in gym but not with ice hockey. Mother thinks that when he coughs a lot his color seems to drain from his face.   August, 2019: He was last seen in March at which time he was advised to stop Flovent when the weather changed to the spring.  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 No activity limitation during the sports. He has been having an early morning cough.  Last night he had a very low grade temp.  No wheezing, no albuterol use.  Over the past 2 days he has had some mild cough, low grade fever.  Just went for annual physical.  Takes dymista and levocetirizine daily.  No recent antibiotic use.   March 2019: No flu this year.  2 episodes of "wheezing" noted at school during after school house while running in the gym. He did not receive albuterol at the time. Symptoms subsided within a few minutes. No chest tightness, some cough, no activity limitation. Patient noted that he had some wheezing. He had only 1 minor cold this winter and did not use the ventolin.  He was playing ice hockey and skiing with no problems.  Minimal nasal symptoms while on xyzal but if he comes off of it he has symptoms. Itchy eyes.   November: He has been well since his last visit. He tried to come off of his oral antihistamine for 3 or 4 days and his eyes became puffy. He continues to take Dymista daily. 2 weeks ago he had a nosebleed. Mom continued the Dymista but applied some saline.  About 1 month ago he was in the [employer], was playing tag and developed wheezing. He told his teacher, took water and sat down. Did not take the inhaler and sx resolved.  No cough at night. No activity limitation. He play baseball, football and ice hockey.  He received his flu shot.   Last weekend he had a fever to 101, headache and stomach but these self-resolved.   August, 2018: He had a cold last week with fever. His allergy symptoms have been flaring up this week with nasal congestion and some ocular pruritus. He has been taking dymista and levocetirizine daily since his last visit. He has also been on Flovent since the end of May. He has not required and doses of albuterol. This summer he attended baseball camp, regular camp and wrestling camp. He was able to keep up with his peers and never had any activity limitation. Even prior to the Flovent he never had difficulty with baseball so this is not really a change for him, however the cough has improved dramatically since starting Flovent. Cough initially started in January and was present both day and night. It prevented Thai from falling asleep and was very disruptive in class. This most recent cold was characterized by headache, fever and congestion but no cough.  Overall his mom thinks he is doing much better in regards to cough since starting Flovent.  June 2018: At his last visit he was started on Flovent 44mcg/puff due to persistent cough despite treatment for rhinitis and some bronchodilator reversibility. His cough has considerably improved since starting Flovent. He has been taking Flovent 2 puffs BID with a spacer. Rinses his mouth out after Flovent use. He has no activity limitation and no nocturnal cough. He is at baseball camp and keeps up with peers. Early AM cough has disappeared.  He continues on the Flonase and levocetirizine. No more nasal congestion.  He is stable from a concussion standpoint.  May, 2018:  At his last visit, his nasal smear was positive for eosinophils. IDT positive to cat and mold and the IDT to dust mite became red and itchy the next day. Since his last visit he has been much improved overall from an allergy perspective however he fell down the stairs and has a concussion. His grandfather also just passed away. No more episodes of ocular swelling.  He has episodes of coughing more at night than during the day but also during the day. He has a dry cough that prevents him from falling asleep at times. Sometimes he wakes up with chest tightness and then coughs. Last albuterol use was 3 days ago.   Nasal congestion much better. Some eye itching. Taking levocetirizine nightly as well as flonase.   March, 2018: Since the beginning of February he has had unilateral and bilateral ocular swelling and pruritus. Burning sometimes too. Eyes look "glistening" and he has some clear/whitish drainage. No green drainage. No fever. Episodes of ocular swelling last a few hours and then self- resolve. Occurs more at home but does have symptoms at school. Seen by Dr. Brooks in February and both SPT and immunocap testing were negative to environmental allergens. Seeking any possible further allergy testing. He tried daily zyrtec and took that for the past month. Prior to that he was on claritin daily.  Olopatadine since February - daily. Also on dymista nasal spray since Feb.  Some of the rhinorrhea symptoms have improved but ocular symptoms are the same.   Seen by Dr. Alarcon from ENT who did a rhinoscopy - no nasal polyps but significant nasal congestion. Recommended sinus rinses.   Food allergy: No suspicion for food allergy. Tolerates milk, eggs, wheat, soy, peanut, tree nut, fish and shellfish.  Urticaria - develops hives when he is sick. Occurs a few times a year, not frequently.   Infections - about once or twice a year he takes antibiotics. No recurrent AOM, PNA, bronchitis.  No hospitalizations